# Patient Record
Sex: FEMALE | Race: WHITE | NOT HISPANIC OR LATINO | Employment: FULL TIME | URBAN - NONMETROPOLITAN AREA
[De-identification: names, ages, dates, MRNs, and addresses within clinical notes are randomized per-mention and may not be internally consistent; named-entity substitution may affect disease eponyms.]

---

## 2017-06-15 LAB — PAP RECOMMENDATION EXT: NORMAL

## 2018-08-01 ENCOUNTER — HISTORICAL (OUTPATIENT)
Dept: ADMINISTRATIVE | Facility: HOSPITAL | Age: 23
End: 2018-08-01

## 2019-06-10 ENCOUNTER — HISTORICAL (OUTPATIENT)
Dept: ADMINISTRATIVE | Facility: HOSPITAL | Age: 24
End: 2019-06-10

## 2020-08-19 ENCOUNTER — HISTORICAL (OUTPATIENT)
Dept: ADMINISTRATIVE | Facility: HOSPITAL | Age: 25
End: 2020-08-19

## 2021-09-15 LAB
ALBUMIN SERPL-MCNC: 3.9 G/DL (ref 3.4–5)
ALBUMIN/GLOB SERPL: 1.3 {RATIO}
ALP SERPL-CCNC: 88 U/L (ref 50–144)
ALT SERPL-CCNC: 63 U/L (ref 1–45)
ANION GAP SERPL CALC-SCNC: 7 MMOL/L (ref 7–16)
AST SERPL-CCNC: 24 U/L (ref 14–36)
BASOPHILS # BLD AUTO: 0.04 X10(3)/MCL (ref 0.01–0.08)
BASOPHILS NFR BLD AUTO: 0.5 % (ref 0.1–1.2)
BILIRUB SERPL-MCNC: 0.36 MG/DL (ref 0.1–1)
BUN SERPL-MCNC: 9 MG/DL (ref 7–20)
CALCIUM SERPL-MCNC: 8.9 MG/DL (ref 8.4–10.2)
CHLORIDE SERPL-SCNC: 106 MMOL/L (ref 94–110)
CO2 SERPL-SCNC: 27 MMOL/L (ref 21–32)
CREAT SERPL-MCNC: 0.5 MG/DL (ref 0.52–1.04)
CREAT/UREA NIT SERPL: 18 (ref 12–20)
EOSINOPHIL # BLD AUTO: 0.19 X10(3)/MCL (ref 0.04–0.36)
EOSINOPHIL NFR BLD AUTO: 2.5 % (ref 0.7–7)
ERYTHROCYTE [DISTWIDTH] IN BLOOD BY AUTOMATED COUNT: 13.6 % (ref 11–14.5)
EST. AVERAGE GLUCOSE BLD GHB EST-MCNC: 120 MG/DL (ref 70–115)
GLOBULIN SER-MCNC: 3 G/DL (ref 2–3.9)
GLUCOSE SERPL-MCNC: 129 MGM./DL (ref 70–115)
HBA1C MFR BLD: 6 % (ref 4–6)
HCT VFR BLD AUTO: 42.7 % (ref 36–48)
HGB BLD-MCNC: 13.8 G/DL (ref 11.8–16)
IMM GRANULOCYTES # BLD AUTO: 0.01 X10E3/UL (ref 0–0.03)
IMM GRANULOCYTES NFR BLD AUTO: 0.1 % (ref 0–0.5)
LYMPHOCYTES # BLD AUTO: 2.07 X10(3)/MCL (ref 1.16–3.74)
LYMPHOCYTES NFR BLD AUTO: 27.6 % (ref 20–55)
MCH RBC QN AUTO: 27.2 PG (ref 27–34)
MCHC RBC AUTO-ENTMCNC: 32.3 G/DL (ref 31–37)
MCV RBC AUTO: 84.2 FL (ref 79–99)
MONOCYTES # BLD AUTO: 0.54 X10(3)/MCL (ref 0.24–0.36)
MONOCYTES NFR BLD AUTO: 7.2 % (ref 4.7–12.5)
NEUTROPHILS # BLD AUTO: 4.65 X10(3)/MCL (ref 1.56–6.13)
NEUTROPHILS NFR BLD AUTO: 62.1 % (ref 37–73)
PLATELET # BLD AUTO: 189 X10(3)/MCL (ref 140–371)
PMV BLD AUTO: 11.9 FL (ref 9.4–12.4)
POTASSIUM SERPL-SCNC: 4.2 MMOL/L (ref 3.5–5.1)
PROT SERPL-MCNC: 6.9 G/DL (ref 6.3–8.2)
RBC # BLD AUTO: 5.07 X10(6)/MCL (ref 4–5.1)
SODIUM SERPL-SCNC: 140 MMOL/L (ref 135–145)
WBC # SPEC AUTO: 7.5 X10(3)/MCL (ref 4–11.5)

## 2022-03-15 ENCOUNTER — HISTORICAL (OUTPATIENT)
Dept: ADMINISTRATIVE | Facility: HOSPITAL | Age: 27
End: 2022-03-15

## 2022-04-10 ENCOUNTER — HISTORICAL (OUTPATIENT)
Dept: ADMINISTRATIVE | Facility: HOSPITAL | Age: 27
End: 2022-04-10

## 2022-04-27 VITALS
BODY MASS INDEX: 42.41 KG/M2 | WEIGHT: 248.44 LBS | DIASTOLIC BLOOD PRESSURE: 92 MMHG | SYSTOLIC BLOOD PRESSURE: 128 MMHG | HEIGHT: 64 IN | OXYGEN SATURATION: 99 %

## 2022-05-01 ENCOUNTER — HISTORICAL (OUTPATIENT)
Dept: ADMINISTRATIVE | Facility: HOSPITAL | Age: 27
End: 2022-05-01

## 2022-09-30 RX ORDER — CETIRIZINE HYDROCHLORIDE 10 MG/1
10 TABLET ORAL
COMMUNITY
Start: 2021-09-21 | End: 2022-10-03

## 2022-09-30 RX ORDER — BUTALBITAL, ACETAMINOPHEN AND CAFFEINE 300; 40; 50 MG/1; MG/1; MG/1
CAPSULE ORAL
COMMUNITY
Start: 2021-10-18 | End: 2022-10-03

## 2022-10-03 ENCOUNTER — OFFICE VISIT (OUTPATIENT)
Dept: INTERNAL MEDICINE | Facility: CLINIC | Age: 27
End: 2022-10-03
Payer: MEDICAID

## 2022-10-03 ENCOUNTER — LAB VISIT (OUTPATIENT)
Dept: LAB | Facility: HOSPITAL | Age: 27
End: 2022-10-03
Attending: NURSE PRACTITIONER
Payer: MEDICAID

## 2022-10-03 VITALS
HEIGHT: 64 IN | DIASTOLIC BLOOD PRESSURE: 84 MMHG | RESPIRATION RATE: 16 BRPM | BODY MASS INDEX: 43.36 KG/M2 | TEMPERATURE: 98 F | HEART RATE: 64 BPM | SYSTOLIC BLOOD PRESSURE: 137 MMHG | WEIGHT: 254 LBS

## 2022-10-03 DIAGNOSIS — R73.01 IFG (IMPAIRED FASTING GLUCOSE): ICD-10-CM

## 2022-10-03 DIAGNOSIS — Z11.59 NEED FOR HEPATITIS C SCREENING TEST: ICD-10-CM

## 2022-10-03 DIAGNOSIS — Z00.00 WELLNESS EXAMINATION: ICD-10-CM

## 2022-10-03 DIAGNOSIS — F41.8 DEPRESSION WITH ANXIETY: ICD-10-CM

## 2022-10-03 DIAGNOSIS — G43.709 CHRONIC MIGRAINE WITHOUT AURA WITHOUT STATUS MIGRAINOSUS, NOT INTRACTABLE: ICD-10-CM

## 2022-10-03 DIAGNOSIS — R51.9 WORSENING HEADACHES: ICD-10-CM

## 2022-10-03 DIAGNOSIS — Z00.00 WELLNESS EXAMINATION: Primary | ICD-10-CM

## 2022-10-03 LAB
ALBUMIN SERPL-MCNC: 3.8 GM/DL (ref 3.5–5)
ALBUMIN/GLOB SERPL: 1.1 RATIO (ref 1.1–2)
ALP SERPL-CCNC: 63 UNIT/L (ref 40–150)
ALT SERPL-CCNC: 73 UNIT/L (ref 0–55)
AST SERPL-CCNC: 26 UNIT/L (ref 5–34)
BASOPHILS # BLD AUTO: 0.03 X10(3)/MCL (ref 0–0.2)
BASOPHILS NFR BLD AUTO: 0.5 %
BILIRUBIN DIRECT+TOT PNL SERPL-MCNC: 0.5 MG/DL
BUN SERPL-MCNC: 10.8 MG/DL (ref 7–18.7)
CALCIUM SERPL-MCNC: 9.3 MG/DL (ref 8.4–10.2)
CHLORIDE SERPL-SCNC: 109 MMOL/L (ref 98–107)
CHOLEST SERPL-MCNC: 188 MG/DL
CHOLEST/HDLC SERPL: 4 {RATIO} (ref 0–5)
CO2 SERPL-SCNC: 26 MMOL/L (ref 22–29)
CREAT SERPL-MCNC: 0.6 MG/DL (ref 0.55–1.02)
DEPRECATED CALCIDIOL+CALCIFEROL SERPL-MC: 19.2 NG/ML (ref 30–80)
EOSINOPHIL # BLD AUTO: 0.12 X10(3)/MCL (ref 0–0.9)
EOSINOPHIL NFR BLD AUTO: 2 %
ERYTHROCYTE [DISTWIDTH] IN BLOOD BY AUTOMATED COUNT: 13.9 % (ref 11.5–17)
EST. AVERAGE GLUCOSE BLD GHB EST-MCNC: 137 MG/DL
GFR SERPLBLD CREATININE-BSD FMLA CKD-EPI: >60 MLS/MIN/1.73/M2
GLOBULIN SER-MCNC: 3.5 GM/DL (ref 2.4–3.5)
GLUCOSE SERPL-MCNC: 114 MG/DL (ref 74–100)
HBA1C MFR BLD: 6.4 %
HCT VFR BLD AUTO: 44.4 % (ref 37–47)
HCV AB SERPL QL IA: NONREACTIVE
HDLC SERPL-MCNC: 42 MG/DL (ref 35–60)
HGB BLD-MCNC: 13.7 GM/DL (ref 12–16)
IMM GRANULOCYTES # BLD AUTO: 0.01 X10(3)/MCL (ref 0–0.04)
IMM GRANULOCYTES NFR BLD AUTO: 0.2 %
LDLC SERPL CALC-MCNC: 121 MG/DL (ref 50–140)
LYMPHOCYTES # BLD AUTO: 2.57 X10(3)/MCL (ref 0.6–4.6)
LYMPHOCYTES NFR BLD AUTO: 43.9 %
MCH RBC QN AUTO: 26.4 PG (ref 27–31)
MCHC RBC AUTO-ENTMCNC: 30.9 MG/DL (ref 33–36)
MCV RBC AUTO: 85.7 FL (ref 80–94)
MONOCYTES # BLD AUTO: 0.37 X10(3)/MCL (ref 0.1–1.3)
MONOCYTES NFR BLD AUTO: 6.3 %
NEUTROPHILS # BLD AUTO: 2.8 X10(3)/MCL (ref 2.1–9.2)
NEUTROPHILS NFR BLD AUTO: 47.1 %
NRBC BLD AUTO-RTO: 0 %
PLATELET # BLD AUTO: 215 X10(3)/MCL (ref 130–400)
PMV BLD AUTO: 11.4 FL (ref 7.4–10.4)
POTASSIUM SERPL-SCNC: 4.1 MMOL/L (ref 3.5–5.1)
PROT SERPL-MCNC: 7.3 GM/DL (ref 6.4–8.3)
RBC # BLD AUTO: 5.18 X10(6)/MCL (ref 4.2–5.4)
SODIUM SERPL-SCNC: 142 MMOL/L (ref 136–145)
TRIGL SERPL-MCNC: 126 MG/DL (ref 37–140)
VLDLC SERPL CALC-MCNC: 25 MG/DL
WBC # SPEC AUTO: 5.9 X10(3)/MCL (ref 4.5–11.5)

## 2022-10-03 PROCEDURE — 1160F RVW MEDS BY RX/DR IN RCRD: CPT | Mod: CPTII,,, | Performed by: NURSE PRACTITIONER

## 2022-10-03 PROCEDURE — 3075F SYST BP GE 130 - 139MM HG: CPT | Mod: CPTII,,, | Performed by: NURSE PRACTITIONER

## 2022-10-03 PROCEDURE — 3079F DIAST BP 80-89 MM HG: CPT | Mod: CPTII,,, | Performed by: NURSE PRACTITIONER

## 2022-10-03 PROCEDURE — 99203 OFFICE O/P NEW LOW 30 MIN: CPT | Mod: S$PBB,,, | Performed by: NURSE PRACTITIONER

## 2022-10-03 PROCEDURE — 86803 HEPATITIS C AB TEST: CPT

## 2022-10-03 PROCEDURE — 3008F PR BODY MASS INDEX (BMI) DOCUMENTED: ICD-10-PCS | Mod: CPTII,,, | Performed by: NURSE PRACTITIONER

## 2022-10-03 PROCEDURE — 80053 COMPREHEN METABOLIC PANEL: CPT

## 2022-10-03 PROCEDURE — 3079F PR MOST RECENT DIASTOLIC BLOOD PRESSURE 80-89 MM HG: ICD-10-PCS | Mod: CPTII,,, | Performed by: NURSE PRACTITIONER

## 2022-10-03 PROCEDURE — 83036 HEMOGLOBIN GLYCOSYLATED A1C: CPT

## 2022-10-03 PROCEDURE — 1160F PR REVIEW ALL MEDS BY PRESCRIBER/CLIN PHARMACIST DOCUMENTED: ICD-10-PCS | Mod: CPTII,,, | Performed by: NURSE PRACTITIONER

## 2022-10-03 PROCEDURE — 99203 PR OFFICE/OUTPT VISIT, NEW, LEVL III, 30-44 MIN: ICD-10-PCS | Mod: S$PBB,,, | Performed by: NURSE PRACTITIONER

## 2022-10-03 PROCEDURE — 3008F BODY MASS INDEX DOCD: CPT | Mod: CPTII,,, | Performed by: NURSE PRACTITIONER

## 2022-10-03 PROCEDURE — 36415 COLL VENOUS BLD VENIPUNCTURE: CPT

## 2022-10-03 PROCEDURE — 3075F PR MOST RECENT SYSTOLIC BLOOD PRESS GE 130-139MM HG: ICD-10-PCS | Mod: CPTII,,, | Performed by: NURSE PRACTITIONER

## 2022-10-03 PROCEDURE — 85025 COMPLETE CBC W/AUTO DIFF WBC: CPT

## 2022-10-03 PROCEDURE — 1159F PR MEDICATION LIST DOCUMENTED IN MEDICAL RECORD: ICD-10-PCS | Mod: CPTII,,, | Performed by: NURSE PRACTITIONER

## 2022-10-03 PROCEDURE — 1159F MED LIST DOCD IN RCRD: CPT | Mod: CPTII,,, | Performed by: NURSE PRACTITIONER

## 2022-10-03 PROCEDURE — 82306 VITAMIN D 25 HYDROXY: CPT

## 2022-10-03 PROCEDURE — 99214 OFFICE O/P EST MOD 30 MIN: CPT | Mod: PBBFAC | Performed by: NURSE PRACTITIONER

## 2022-10-03 PROCEDURE — 80061 LIPID PANEL: CPT

## 2022-10-03 RX ORDER — DULOXETIN HYDROCHLORIDE 60 MG/1
60 CAPSULE, DELAYED RELEASE ORAL DAILY
COMMUNITY
Start: 2022-09-21 | End: 2022-11-30 | Stop reason: SDUPTHER

## 2022-10-03 RX ORDER — TOPIRAMATE 25 MG/1
TABLET ORAL
COMMUNITY
Start: 2022-09-23 | End: 2022-10-26

## 2022-10-03 RX ORDER — PROPRANOLOL HYDROCHLORIDE 80 MG/1
80 TABLET ORAL 2 TIMES DAILY
COMMUNITY
Start: 2022-09-21 | End: 2023-08-18 | Stop reason: SDUPTHER

## 2022-10-03 NOTE — ASSESSMENT & PLAN NOTE
A1C Today   Follow ADA diet.  Avoid soda, simple sweets, and limit rice/pasta/bread/starches and consume brown options when possible.   Maintain healthy weight with BMI goal <30.   Perform aerobic exercise for 150 minutes per week (or 5 days a week for 30 minutes each day).   Examine feet daily.

## 2022-10-03 NOTE — ASSESSMENT & PLAN NOTE
RX Cymbalta 60 mg daily  Referral to Dr. Chaney  Read positive daily meditations, avoid negative media, set healthy boundaries.  Exercise daily, keep consistent sleep pattern, eat a healthy diet.  Establish good social support, make changes to reduce stress.  Do not drink alcohol or use illicit drugs.  Reports any symptoms of suicidal/homicidal ideations or self harm immediately, go to nearest emergency room.

## 2022-10-03 NOTE — PROGRESS NOTES
"  GRACY Ambrosio   [facility]  [address] [address]      PATIENT NAME: Cassandra Escobedo  : 1995  DATE: 10/3/22  MRN: [id number]      Billing Provider: GRACY Ambrosio  Level of Service: AR OFFICE/OUTPT VISIT, NEW, LEVL III, 30-44 MIN  Patient PCP Information       Provider PCP Type    GRACY Ambrosio General            Reason for Visit / Chief Complaint: No chief complaint on file.       History of Present Illness / Problem Focused Workflow     Cassandra Escobedo presents to the clinic with No chief complaint on file.     26 yo WF here today to establish care, previous PCP Norma Mcfarlane.    Cervical Cancer Screening:  Osteoporosis Screening: 10/03/22  HCV Screening: 10/03/22    10/03/22  Pt here today w/ complaints of migraines and "sharp pains in her head", was prescribed Topamax by her previous provider but has not started it yet and was to f/u in 2 months.   Reports started with migraines about 2 years ago, they have started worsening over the last 2 months they have increased in frequency and in intensity, reports it starts at sharp pains starting from the base of her head and then radiate to the front of her head that causes dizziness when she stands, denies any pattern; aggravating factors include alcohol & stress, reports it is mostly the right side of her head and then slowly works it way to the left side, reports n/v when it occurs, denies photosensitivity; occurs multiple times per day - she reports that she has sharp pains occur multiple times per day, every day and last up to 10-15 minutes at at time and then the dull throbbing continues through out the day.  Reports migraines occur 1 - 2 times per week; wears glasses, yearly vision check up is this month. Pt is currently on Cymbalta for anxiety / depression and the propanolol is for the migraines, both scheduled medications. Takes ibuprofen 800 mg as needed. " Denies any correlation with these symptoms to her cycle. The pt request further testing as medication conservative treatment therapies have not worked well. MRI brain without contrast ordered today, will f/u with pt juanito  few weeks to discuss labs via audio.   Denies chest pain, shortness of breath, cough, fever, headache, dizziness, weakness, abdominal pain, nausea, vomiting, diarrhea, constipation, black/bloody stools, unplanned weight loss, night sweats, changes in urinary patterns, burning/odor with urination, depression, anxiety, and SI/HI.         Review of Systems     Review of Systems   Constitutional: Negative.    HENT: Negative.     Eyes: Negative.    Respiratory: Negative.     Cardiovascular: Negative.    Gastrointestinal: Negative.    Endocrine: Negative.    Genitourinary: Negative.    Neurological:  Positive for headaches.   Psychiatric/Behavioral: Negative.       Medical / Social / Family History     Past Medical History:   Diagnosis Date    Anxiety        Past Surgical History:   Procedure Laterality Date     SECTION  2018    ELBOW SURGERY Right 2000       Social History  Ms.  reports that she has been smoking cigarettes. She has never used smokeless tobacco. She reports current alcohol use. She reports current drug use. Drug: Marijuana.    Family History  Ms.'s family history includes Diabetes in her mother; Hyperlipidemia in her father and mother; Hypertension in her mother.    Medications and Allergies     Medications  Medication List with Changes/Refills   Current Medications    DULOXETINE (CYMBALTA) 60 MG CAPSULE    Take 60 mg by mouth once daily.    PROPRANOLOL (INDERAL) 80 MG TABLET    Take 80 mg by mouth 2 (two) times daily.    TOPIRAMATE (TOPAMAX) 25 MG TABLET    Take by mouth. Will start at later date   Discontinued Medications    BUTALBITAL-ACETAMINOPHEN-CAFF -40 MG CAP      See Instructions, TAKE ONE CAPSULE BY MOUTH EVERY 4 HOURS AS NEEDED, # 30 cap(s), 2 Refill(s),  Pharm[facility]: [facility] 163, cm, Height/Length Dosing, 09/21/21 8:52:00 CDT, 114.2, kg, Weight Dosing, 09/21/21 8:52:00 CDT    CETIRIZINE (ZYRTEC) 10 MG TABLET    Take 10 mg by mouth.        Allergies  Review of patient's allergies indicates:  No Known Allergies    Physical Examination     Vitals:    10/03/22 1007   BP: 137/84   Pulse: 64   Resp: 16   Temp: 98 °F (36.7 °C)     Physical Exam  Vitals reviewed.   Constitutional:       Appearance: Normal appearance. She is normal weight.   HENT:      Head: Normocephalic.   Cardiovascular:      Rate and Rhythm: Normal rate and regular rhythm.      Pulses: Normal pulses.      Heart sounds: Normal heart sounds.   Pulmonary:      Effort: Pulmonary effort is normal.      Breath sounds: Normal breath sounds.   Abdominal:      General: Abdomen is flat.      Palpations: Abdomen is soft.   Musculoskeletal:         General: Normal range of motion.      Cervical back: Normal range of motion.   Skin:     General: Skin is warm and dry.   Neurological:      Mental Status: She is alert.   Psychiatric:         Mood and Affect: Mood normal.         Results     Lab Results   Component Value Date    WBC 7.5 09/15/2021    RBC 5.07 09/15/2021    HGB 13.8 09/15/2021    HCT 42.7 09/15/2021    MCV 84.2 09/15/2021    MCH 27.2 09/15/2021    MCHC 32.3 09/15/2021    RDW 13.6 09/15/2021     09/15/2021    MPV 11.9 09/15/2021     CMP  Sodium Level   Date Value Ref Range Status   09/15/2021 140 135 - 145 mmol/L Final     Potassium Level   Date Value Ref Range Status   09/15/2021 4.2 3.5 - 5.1 mmol/L Final     Carbon Dioxide   Date Value Ref Range Status   09/15/2021 27 21 - 32 mmol/L Final     Blood Urea Nitrogen   Date Value Ref Range Status   09/15/2021 9 7 - 20 mg/dL Final     Creatinine   Date Value Ref Range Status   09/15/2021 0.50 (L) 0.52 - 1.04 mg/dL Final     Calcium Level Total   Date Value Ref Range Status   09/15/2021 8.9 8.4 - 10.2 mg/dL Final     Albumin Level   Date Value Ref  Range Status   09/15/2021 3.9 3.4 - 5.0 g/dL Final     Bilirubin Total   Date Value Ref Range Status   09/15/2021 0.36 0.10 - 1.00 mg/dL Final     Alkaline Phosphatase   Date Value Ref Range Status   09/15/2021 88 50 - 144 U/L Final     Aspartate Aminotransferase   Date Value Ref Range Status   09/15/2021 24 14 - 36 U/L Final     Alanine Aminotransferase   Date Value Ref Range Status   09/15/2021 63 (H) 1 - 45 U/L Final     eGFR   Date Value Ref Range Status   09/15/2021 159 mL/min/1.73 m2 Final     No results found for: CHOL  No results found for: HDL  No results found for: LDLCALC  No results found for: TRIG  No results found for: CHOLHDL  No results found for: TSH  No results found for: PHUR, SPECGRAV, PROTEINUA, GLUCUA, KETONESU, OCCULTUA, NITRITE, LEUKOCYTESUR        Assessment and Plan (including Health Maintenance)     Plan:         Health Maintenance Due   Topic Date Due    Hepatitis C Screening  Never done    Lipid Panel  Never done    COVID-19 Vaccine (1) Never done    Pneumococcal Vaccines (Age 0-64) (1 - PCV) 02/10/2001    HIV Screening  Never done    Pap Smear  Never done    Influenza Vaccine (1) 09/01/2022       Problem List Items Addressed This Visit          Neuro    Worsening headaches    Current Assessment & Plan     MRI Brain         Relevant Orders    MRI Brain Without Contrast    Chronic migraine without aura without status migrainosus, not intractable    Current Assessment & Plan     MRI Brain         Relevant Orders    MRI Brain Without Contrast       Psychiatric    Depression with anxiety    Current Assessment & Plan     RX Cymbalta 60 mg daily  Referral to Dr. Chaney  Read positive daily meditations, avoid negative media, set healthy boundaries.  Exercise daily, keep consistent sleep pattern, eat a healthy diet.  Establish good social support, make changes to reduce stress.  Do not drink alcohol or use illicit drugs.  Reports any symptoms of suicidal/homicidal ideations or self harm  immediately, go to nearest emergency room.           Relevant Orders    Ambulatory referral/consult to Psychiatry       Endocrine    IFG (impaired fasting glucose)    Current Assessment & Plan     A1C Today   Follow ADA diet.  Avoid soda, simple sweets, and limit rice/pasta/bread/starches and consume brown options when possible.   Maintain healthy weight with BMI goal <30.   Perform aerobic exercise for 150 minutes per week (or 5 days a week for 30 minutes each day).   Examine feet daily.            Relevant Orders    Hemoglobin A1C     Other Visit Diagnoses       Wellness examination    -  Primary    Relevant Orders    CBC Auto Differential    Comprehensive Metabolic Panel    Lipid Panel    Urinalysis, Reflex to Urine Culture    Vitamin D    Need for hepatitis C screening test        Relevant Orders    Hepatitis C Antibody            Health Maintenance Topics with due status: Not Due       Topic Last Completion Date    TETANUS VACCINE 11/22/2017       No future appointments.         Signature:     [facility]  [address] [address]    Date of encounter: 10/3/22

## 2022-10-17 ENCOUNTER — APPOINTMENT (OUTPATIENT)
Dept: RADIOLOGY | Facility: HOSPITAL | Age: 27
End: 2022-10-17
Attending: NURSE PRACTITIONER
Payer: MEDICAID

## 2022-10-17 DIAGNOSIS — G43.709 CHRONIC MIGRAINE WITHOUT AURA WITHOUT STATUS MIGRAINOSUS, NOT INTRACTABLE: ICD-10-CM

## 2022-10-17 DIAGNOSIS — R51.9 WORSENING HEADACHES: ICD-10-CM

## 2022-10-17 PROCEDURE — 70551 MRI BRAIN STEM W/O DYE: CPT | Mod: TC

## 2022-10-26 ENCOUNTER — OFFICE VISIT (OUTPATIENT)
Dept: INTERNAL MEDICINE | Facility: CLINIC | Age: 27
End: 2022-10-26
Payer: MEDICAID

## 2022-10-26 DIAGNOSIS — R51.9 WORSENING HEADACHES: Primary | ICD-10-CM

## 2022-10-26 DIAGNOSIS — J34.1 MAXILLARY SINUS CYST: ICD-10-CM

## 2022-10-26 DIAGNOSIS — R73.03 PREDIABETES: Chronic | ICD-10-CM

## 2022-10-26 DIAGNOSIS — E55.9 VITAMIN D DEFICIENCY: ICD-10-CM

## 2022-10-26 PROCEDURE — 1160F RVW MEDS BY RX/DR IN RCRD: CPT | Mod: CPTII,95,, | Performed by: NURSE PRACTITIONER

## 2022-10-26 PROCEDURE — 1160F PR REVIEW ALL MEDS BY PRESCRIBER/CLIN PHARMACIST DOCUMENTED: ICD-10-PCS | Mod: CPTII,95,, | Performed by: NURSE PRACTITIONER

## 2022-10-26 PROCEDURE — 1159F PR MEDICATION LIST DOCUMENTED IN MEDICAL RECORD: ICD-10-PCS | Mod: CPTII,95,, | Performed by: NURSE PRACTITIONER

## 2022-10-26 PROCEDURE — 99214 PR OFFICE/OUTPT VISIT, EST, LEVL IV, 30-39 MIN: ICD-10-PCS | Mod: 95,,, | Performed by: NURSE PRACTITIONER

## 2022-10-26 PROCEDURE — 99214 OFFICE O/P EST MOD 30 MIN: CPT | Mod: 95,,, | Performed by: NURSE PRACTITIONER

## 2022-10-26 PROCEDURE — 1159F MED LIST DOCD IN RCRD: CPT | Mod: CPTII,95,, | Performed by: NURSE PRACTITIONER

## 2022-10-26 RX ORDER — CETIRIZINE HYDROCHLORIDE 10 MG/1
TABLET ORAL
COMMUNITY
Start: 2022-07-26

## 2022-10-26 RX ORDER — IBUPROFEN 800 MG/1
800 TABLET ORAL
COMMUNITY
Start: 2022-03-09 | End: 2023-08-18

## 2022-10-26 NOTE — ASSESSMENT & PLAN NOTE
Educated on increasing foods high in Vitamin D such as fish oil, cod liver oil, salmon, milk fortified with vitamin D.  g Vitamin D 2000 I.U. tablets daily (purchase over the counter).  Repeat Vitamin D level as ordered.

## 2022-10-26 NOTE — PROGRESS NOTES
"  GRACY Ambrosio   [facility]  [address] [address]      PATIENT NAME: Cassandra Escobedo  : 1995  DATE: 10/26/22  MRN: [id number]      Billing Provider: GRACY Ambrosio  Level of Service: PA OFFICE/OUTPT VISIT, EST, LEVL IV, 30-39 MIN  Patient PCP Information       Provider PCP Type    GRACY Ambrosio General            Reason for Visit / Chief Complaint: Follow-up (Lab review )       History of Present Illness / Problem Focused Workflow     Cassandra Escobedo presents to the clinic with Follow-up (Lab review )     26 yo WF here today for f/u. PMH Migraines & anxiety.     Cervical Cancer Screening:  Osteoporosis Screening: 10/03/22  HCV Screening: 10/03/22    10/03/22  Pt here today w/ complaints of migraines and "sharp pains in her head", was prescribed Topamax by her previous provider but has not started it yet and was to f/u in 2 months.   Reports started with migraines about 2 years ago, they have started worsening over the last 2 months they have increased in frequency and in intensity, reports it starts at sharp pains starting from the base of her head and then radiate to the front of her head that causes dizziness when she stands, denies any pattern; aggravating factors include alcohol & stress, reports it is mostly the right side of her head and then slowly works it way to the left side, reports n/v when it occurs, denies photosensitivity; occurs multiple times per day - she reports that she has sharp pains occur multiple times per day, every day and last up to 10-15 minutes at at time and then the dull throbbing continues through out the day.  Reports migraines occur 1 - 2 times per week; wears glasses, yearly vision check up is this month. Pt is currently on Cymbalta for anxiety / depression and the propanolol is for the migraines, both scheduled medications. Takes ibuprofen 800 mg as needed. Denies any correlation " with these symptoms to her cycle. The pt request further testing as medication conservative treatment therapies have not worked well. MRI brain without contrast ordered today, will f/u with pt in a  few weeks to discuss labs via audio.     10/26/22  Pt here today for lab review via audio and MRI results. Labs discussed with patient with no questions or concerns at this time. Discussed low Vitamin D level, pt will start OTC Vitamin D. Discussed prediabetes status and increase in A1C from last year to 0.1 point away from T2DM, pt is aware, we will eval again in 6 months. MRI results discussed that was ordered for her headaches, MRI was negative, will refer to [facility] clinic today for further evaluation. MRI did note a cyst in the maxillary sinus cavity, pt denies any allergy / sinus type issues just a pressure with her headaches, will refer to ENT today as well for further eval. Agreeable to 6 month f/u via audio virtual with fasting labs to eval prediabetes status. Denies any issues today.   Denies chest pain, shortness of breath, cough, fever, headache, dizziness, weakness, abdominal pain, nausea, vomiting, diarrhea, constipation, black/bloody stools, unplanned weight loss, night sweats, changes in urinary patterns, burning/odor with urination, depression, anxiety, and SI/HI.       Follow-up  Associated symptoms include headaches.     Review of Systems     Review of Systems   Constitutional: Negative.    HENT: Negative.     Eyes: Negative.    Respiratory: Negative.     Cardiovascular: Negative.    Gastrointestinal: Negative.    Endocrine: Negative.    Genitourinary: Negative.    Neurological:  Positive for headaches.   Psychiatric/Behavioral: Negative.       Medical / Social / Family History     Past Medical History:   Diagnosis Date    Anxiety        Past Surgical History:   Procedure Laterality Date     SECTION  2018    ELBOW SURGERY Right        Social History  Ms.  reports that she has  been smoking cigarettes. She has never used smokeless tobacco. She reports current alcohol use. She reports current drug use. Drug: Marijuana.    Family History  Ms.'s family history includes Diabetes in her mother; Hyperlipidemia in her father and mother; Hypertension in her mother.    Medications and Allergies     Medications  Medication List with Changes/Refills   Current Medications    CETIRIZINE (ZYRTEC) 10 MG TABLET      See Instructions, TAKE ONE TABLET BY MOUTH EVERY DAY, # 30 tab(s), 1 Refill(s), Pharmacy: [facility], 163, cm, Height/Length Dosing, 07/05/22 11:37:00 CDT, 113.1, kg, Weight Dosing, 07/05/22 11:37:00 CDT    DULOXETINE (CYMBALTA) 60 MG CAPSULE    Take 60 mg by mouth once daily.    IBUPROFEN (ADVIL,MOTRIN) 800 MG TABLET    Take 800 mg by mouth.    PROPRANOLOL (INDERAL) 80 MG TABLET    Take 80 mg by mouth 2 (two) times daily.   Discontinued Medications    TOPIRAMATE (TOPAMAX) 25 MG TABLET    Take by mouth. Will start at later date         Allergies  Review of patient's allergies indicates:  No Known Allergies    Physical Examination   There were no vitals filed for this visit.  Physical Exam  HENT:      Right Ear: Hearing normal.      Left Ear: Hearing normal.   Neurological:      Mental Status: She is alert and oriented to person, place, and time.   Psychiatric:         Mood and Affect: Mood normal.         Results     Lab Results   Component Value Date    WBC 5.9 10/03/2022    RBC 5.18 10/03/2022    HGB 13.7 10/03/2022    HCT 44.4 10/03/2022    MCV 85.7 10/03/2022    MCH 26.4 (L) 10/03/2022    MCHC 30.9 (L) 10/03/2022    RDW 13.9 10/03/2022     10/03/2022    MPV 11.4 (H) 10/03/2022     Sodium Level   Date Value Ref Range Status   10/03/2022 142 136 - 145 mmol/L Final     Potassium Level   Date Value Ref Range Status   10/03/2022 4.1 3.5 - 5.1 mmol/L Final     Carbon Dioxide   Date Value Ref Range Status   10/03/2022 26 22 - 29 mmol/L Final     Blood Urea Nitrogen   Date Value Ref Range  Status   10/03/2022 10.8 7.0 - 18.7 mg/dL Final     Creatinine   Date Value Ref Range Status   10/03/2022 0.60 0.55 - 1.02 mg/dL Final     Calcium Level Total   Date Value Ref Range Status   10/03/2022 9.3 8.4 - 10.2 mg/dL Final     Albumin Level   Date Value Ref Range Status   10/03/2022 3.8 3.5 - 5.0 gm/dL Final     Bilirubin Total   Date Value Ref Range Status   10/03/2022 0.5 <=1.5 mg/dL Final     Alkaline Phosphatase   Date Value Ref Range Status   10/03/2022 63 40 - 150 unit/L Final     Aspartate Aminotransferase   Date Value Ref Range Status   10/03/2022 26 5 - 34 unit/L Final     Alanine Aminotransferase   Date Value Ref Range Status   10/03/2022 73 (H) 0 - 55 unit/L Final     eGFR   Date Value Ref Range Status   09/15/2021 159 mL/min/1.73 m2 Final     Lab Results   Component Value Date    CHOL 188 10/03/2022     Lab Results   Component Value Date    HDL 42 10/03/2022     No results found for: LDLCALC  Lab Results   Component Value Date    TRIG 126 10/03/2022     No results found for: CHOLHDL  No results found for: TSH  Lab Results   Component Value Date    PROTEINUA Negative 10/03/2022    LEUKOCYTESUR 75 10/03/2022     Lab Results   Component Value Date    HGBA1C 6.4 10/03/2022    HGBA1C 6.0 09/15/2021     No results found for: MICROALBUR, [id number]   No results found for this or any previous visit.         Assessment and Plan (including Health Maintenance)     Plan:         Health Maintenance Due   Topic Date Due    COVID-19 Vaccine (1) Never done    Pneumococcal Vaccines (Age 0-64) (1 - PCV) 02/10/2001    HIV Screening  Never done    Pap Smear  Never done    Influenza Vaccine (1) 09/01/2022       Problem List Items Addressed This Visit          Neuro    Worsening headaches - Primary    Current Assessment & Plan     Referral to [facility]          Relevant Orders    Ambulatory referral/consult to ENT       ENT    Maxillary sinus cyst    Current Assessment & Plan     Referral to ENT          Relevant  Orders    Ambulatory referral/consult to Neurology       Endocrine    Prediabetes (Chronic)    Current Assessment & Plan     Follow ADA diet.  Avoid soda, simple sweets, and limit rice/pasta/bread/starches and consume brown options when possible.   Maintain healthy weight with BMI goal <30.   Perform aerobic exercise for 150 minutes per week (or 5 days a week for 30 minutes each day).              Relevant Orders    Urinalysis, Reflex to Urine Culture Urine, Clean Catch    Microalbumin/Creatinine Ratio, Urine    Basic Metabolic Panel    Hemoglobin A1C    Vitamin D deficiency    Current Assessment & Plan     Educated on increasing foods high in Vitamin D such as fish oil, cod liver oil, salmon, milk fortified with vitamin D.  g Vitamin D 2000 I.U. tablets daily (purchase over the counter).  Repeat Vitamin D level as ordered.              Health Maintenance Topics with due status: Not Due       Topic Last Completion Date    TETANUS VACCINE 11/22/2017       Future Appointments   Date Time Provider Department Center   11/30/2022 10:00 AM Leo Chaney MD [facility]            Signature:     [facility]  [address] [facility] [address]    Date of encounter: 10/26/22    Established Patient - Audio Only Telehealth Visit     The patient location is: home  The chief complaint leading to consultation is: lab review  Visit type: Virtual visit with audio only (telephone)  Total time spent with patient: 11 mins       The reason for the audio only service rather than synchronous audio and video virtual visit was related to technical difficulties or patient preference/necessity.     Each patient to whom I provide medical services by telemedicine is:  (1) informed of the relationship between the physician and patient and the respective role of any other health care provider with respect to management of the patient; and (2) notified that they may decline  to receive medical services by telemedicine and may withdraw from such care at any time. Patient verbally consented to receive this service via voice-only telephone call.       This service was not originating from a related E/M service provided within the previous 7 days nor will  to an E/M service or procedure within the next 24 hours or my soonest available appointment.  Prevailing standard of care was able to be met in this audio-only visit.

## 2022-11-09 ENCOUNTER — OFFICE VISIT (OUTPATIENT)
Dept: OTOLARYNGOLOGY | Facility: CLINIC | Age: 27
End: 2022-11-09
Payer: MEDICAID

## 2022-11-09 VITALS
WEIGHT: 254.19 LBS | DIASTOLIC BLOOD PRESSURE: 80 MMHG | SYSTOLIC BLOOD PRESSURE: 115 MMHG | HEART RATE: 67 BPM | BODY MASS INDEX: 43.63 KG/M2 | TEMPERATURE: 98 F

## 2022-11-09 DIAGNOSIS — R09.81 NASAL CONGESTION: ICD-10-CM

## 2022-11-09 DIAGNOSIS — J31.0 RHINITIS, UNSPECIFIED TYPE: ICD-10-CM

## 2022-11-09 DIAGNOSIS — R51.9 WORSENING HEADACHES: ICD-10-CM

## 2022-11-09 PROCEDURE — 99213 OFFICE O/P EST LOW 20 MIN: CPT | Mod: PBBFAC | Performed by: STUDENT IN AN ORGANIZED HEALTH CARE EDUCATION/TRAINING PROGRAM

## 2022-11-09 PROCEDURE — 31231 NASAL ENDOSCOPY DX: CPT | Mod: PBBFAC | Performed by: STUDENT IN AN ORGANIZED HEALTH CARE EDUCATION/TRAINING PROGRAM

## 2022-11-09 NOTE — PROGRESS NOTES
The scope used for the exam was:  Flexible scope ENF-P4  Serial Number:  1)    [id number]    []   2)    [id number]    []   3)    [id number]    []   4)    [id number]    []   5)    [id number]    [x]   6)    [id number]    []       The scope used for the exam was:  Rigid scope   Serial Number:  1)   [id number]    []   2)   [id number]    []   3)   7330    []   4)    3384   []   5)    0824   []   6)    5554   []     7)   7425    []   8)   2240    []   9)   1109    []

## 2022-11-09 NOTE — PROGRESS NOTES
[facility]  Otolaryngology Clinic Note    Cassandra Escobedo  Encounter Date: 2022  YOB: 1995  Physician:     Chief Complaint: Headaches    HPI: Cassandra Escobedo is a 27 y.o. female with pmh of anxiety and depression who presents to clinic for evaluation of headaches and sinusitis. She states that the headaches began 2 years ago and have increased in intensity over the last year.  Pain begins on the rt side of the face and moves to the lt. The headaches are associated with rhinorrhea, tinnitus, N/V, watery eyes and dizziness. She has also been experiencing new onset epistaxis (about 5 in the last several weeks). She has been given propanolol in the past for the headaches but states that it does not provide relief. The headaches are triggered by caffeine, alcohol, chocolate and stress. MRI performed on 10/17/22 showed a left maxillary sinus mucosal retention cyst w/ no intracranial abnormalities. She has used Flonase and cetirizine in the past with minimal relief. Pertinent family history includes migraines (maternal aunt) and allergic rhinitis (mother). Referral to neurology pending.     ROS:   General: Negative except per HPI  Skin: Denies rash, ulcer, or lesion.  Eyes: Denies vision changes or diplopia.  Ears: Negative except per HPI  Nose: Negative except per HPI  Throat/mouth: Negative except per HPI  Cardiovascular: Negative except per HPI  Respiratory: Negative except per HPI  Neck: Negative except per HPI  Endocrine: Negative except per HPI  Neurologic: Negative except per HPI    Other 10-point review of systems negative except per HPI      Review of patient's allergies indicates:   Allergen Reactions    Phenergan plain     Sulfa (sulfonamide antibiotics)        Past Medical History:   Diagnosis Date    Anxiety     Hypertension        Past Surgical History:   Procedure Laterality Date     SECTION  2018    ELBOW SURGERY Right        Social History      Socioeconomic History    Marital status: Single   Tobacco Use    Smoking status: Some Days     Types: Cigarettes    Smokeless tobacco: Never   Substance and Sexual Activity    Alcohol use: Yes     Comment: occassionally    Drug use: Yes     Types: Marijuana    Sexual activity: Not Currently       Family History   Problem Relation Age of Onset    Hyperlipidemia Mother     Hypertension Mother     Diabetes Mother     Hyperlipidemia Father        Outpatient Encounter Medications as of 11/9/2022   Medication Sig Dispense Refill    DULoxetine (CYMBALTA) 60 MG capsule Take 60 mg by mouth once daily.      propranoloL (INDERAL) 80 MG tablet Take 80 mg by mouth 2 (two) times daily.      cetirizine (ZYRTEC) 10 MG tablet   See Instructions, TAKE ONE TABLET BY MOUTH EVERY DAY, # 30 tab(s), 1 Refill(s), [facility], 163, cm, Height/Length Dosing, 07/05/22 11:37:00 CDT, 113.1, kg, Weight Dosing, 07/05/22 11:37:00 CDT      ibuprofen (ADVIL,MOTRIN) 800 MG tablet Take 800 mg by mouth.       No facility-administered encounter medications on file as of 11/9/2022.       Physical Exam:  Vitals:    11/09/22 0915   BP: 115/80   Pulse: 67   Temp: 97.6 °F (36.4 °C)   Weight: 115.3 kg (254 lb 3.2 oz)       Constitutional  General Appearance: well nourished, well-developed, AAO x3, NAD  HEENT  Eyes: EOMI, normal conjunctivae  Ears: Hearing well at conversation level   AD: auricle normal, EAC normal, TM intact, no EUGENE   AS: auricle normal, EAC normal with some dried dark cerumen, TM intact, no EUGENE   Vestibular: ambulates without gait disturbance  Nose: septum midline with swell body noted , mild inferior turbinate hypertrophy, no polyps, moist mucosa without erythema or blue hue  OC/OP: dentition moderate, no oral lesions, tongue/FOM/BOT- soft, no leukoplakia/ulcerations/ tenderness, tonsils +  Nasopharynx, Hypopharynx, and Larynx:    Indirect: attempted, limited view due to patient intolerance  Neck: soft, non-tender, no palpable  lymph nodes   Thyroid region- no nodules or goiter  Neuro: CN II - XII intact bilaterally  Respiratory: non-labored respirations  Psychiatric: oriented to time, place and person, no depression, anxiety or agitation    Procedures:Flexible Fiberoptic Nasopharyngoscopy via bilateral nare    Procedure in Detail: Informed consent was obtained from the patient after explanation of procedure, indications, risks and benefits. Flexible endoscopy was performed through the nasal passages. The nasal cavity, nasopharynx.    Anesthesia: Topical Neosynephrine / Tetracaine  Adverse Events: None  Resident Surgeon: OLGA Goldstein MD   Blood loss: none  Condition: good    Findings:  NP/OP: no masses/lesions of NC, eustachian tube, fossa of Rosenmuller, no adenoid hypertrophy  Swell body noted at anterior septum with some contact with bilateral middle turbinate  Middle meatus open bilaterally  No purulence noted  No masses or lesions noted  Mild inferior turbinate hypertrophy bilaterally without rhinorrhea.  Nasopharynx clear without any mass or lesion.      Pertinent Data:  ? LABS:  ? AUDIO:  ? CT Scan:    Imaging:   I personally reviewed the following images:    Summary of Outside Records:      Assessment/Plan:  Cassandra Escobedo is a 27 y.o. female with pmh of anxiety and depression who presents to clinic for evaluation of headaches and rhinitis, congestion. Patient symptoms of headache associated with eye watering and rhinorrhea likely a migraine.  She is being treated medically for migraines.  She does have a neurology appointment in April 2023.  She does have some nasal congestion and rhinitis today.  She is tried Zyrtec in the past and intermittent Flonase use without much success.  We will transition to Allegra or generic version fexofenadine OTC as well as trying Flonase or Sensimist formula b.i.d. with NSI    - NSI BID  - Flonase or sensimist use BID   - if patient's symptoms have continued at the 6 week atilio we may  consider a CT scan without contrast to evaluate bony structures of the face as the prior MRI is not good for bone evaluation.  - 6 weeks for telemedicine visit.     Jennifer Schulte, MS3       I evaluated and examined patient independently. Agree with St. Dr. Schulte note and made changes where appropriate.     OLGA Goldstein MD  [facility] Otolaryngology PGY III

## 2022-11-30 ENCOUNTER — OFFICE VISIT (OUTPATIENT)
Dept: BEHAVIORAL HEALTH | Facility: CLINIC | Age: 27
End: 2022-11-30
Payer: MEDICAID

## 2022-11-30 VITALS
HEART RATE: 81 BPM | WEIGHT: 255.19 LBS | SYSTOLIC BLOOD PRESSURE: 136 MMHG | BODY MASS INDEX: 43.8 KG/M2 | DIASTOLIC BLOOD PRESSURE: 80 MMHG | TEMPERATURE: 98 F | OXYGEN SATURATION: 100 %

## 2022-11-30 DIAGNOSIS — F41.1 GAD (GENERALIZED ANXIETY DISORDER): Primary | ICD-10-CM

## 2022-11-30 DIAGNOSIS — F33.1 MODERATE EPISODE OF RECURRENT MAJOR DEPRESSIVE DISORDER: ICD-10-CM

## 2022-11-30 LAB
AMPHET UR QL SCN: NEGATIVE
BARBITURATE SCN PRESENT UR: NEGATIVE
BENZODIAZ UR QL SCN: NEGATIVE
CANNABINOIDS UR QL SCN: NEGATIVE
COCAINE UR QL SCN: NEGATIVE
FENTANYL UR QL SCN: NEGATIVE
MDMA UR QL SCN: NEGATIVE
OPIATES UR QL SCN: NEGATIVE
PCP UR QL: NEGATIVE
PH UR: 6.5 [PH] (ref 3–11)
SPECIFIC GRAVITY, URINE AUTO (.000) (OHS): 1.02 (ref 1–1.03)
TSH SERPL-ACNC: 0.96 UIU/ML (ref 0.35–4.94)

## 2022-11-30 PROCEDURE — 3075F SYST BP GE 130 - 139MM HG: CPT | Mod: CPTII,,, | Performed by: STUDENT IN AN ORGANIZED HEALTH CARE EDUCATION/TRAINING PROGRAM

## 2022-11-30 PROCEDURE — 3079F DIAST BP 80-89 MM HG: CPT | Mod: CPTII,,, | Performed by: STUDENT IN AN ORGANIZED HEALTH CARE EDUCATION/TRAINING PROGRAM

## 2022-11-30 PROCEDURE — 99213 OFFICE O/P EST LOW 20 MIN: CPT | Mod: PBBFAC,PN | Performed by: STUDENT IN AN ORGANIZED HEALTH CARE EDUCATION/TRAINING PROGRAM

## 2022-11-30 PROCEDURE — 1160F RVW MEDS BY RX/DR IN RCRD: CPT | Mod: CPTII,,, | Performed by: STUDENT IN AN ORGANIZED HEALTH CARE EDUCATION/TRAINING PROGRAM

## 2022-11-30 PROCEDURE — 1160F PR REVIEW ALL MEDS BY PRESCRIBER/CLIN PHARMACIST DOCUMENTED: ICD-10-PCS | Mod: CPTII,,, | Performed by: STUDENT IN AN ORGANIZED HEALTH CARE EDUCATION/TRAINING PROGRAM

## 2022-11-30 PROCEDURE — 1159F MED LIST DOCD IN RCRD: CPT | Mod: CPTII,,, | Performed by: STUDENT IN AN ORGANIZED HEALTH CARE EDUCATION/TRAINING PROGRAM

## 2022-11-30 PROCEDURE — 3075F PR MOST RECENT SYSTOLIC BLOOD PRESS GE 130-139MM HG: ICD-10-PCS | Mod: CPTII,,, | Performed by: STUDENT IN AN ORGANIZED HEALTH CARE EDUCATION/TRAINING PROGRAM

## 2022-11-30 PROCEDURE — 80307 DRUG TEST PRSMV CHEM ANLYZR: CPT | Performed by: STUDENT IN AN ORGANIZED HEALTH CARE EDUCATION/TRAINING PROGRAM

## 2022-11-30 PROCEDURE — 3008F BODY MASS INDEX DOCD: CPT | Mod: CPTII,,, | Performed by: STUDENT IN AN ORGANIZED HEALTH CARE EDUCATION/TRAINING PROGRAM

## 2022-11-30 PROCEDURE — 3008F PR BODY MASS INDEX (BMI) DOCUMENTED: ICD-10-PCS | Mod: CPTII,,, | Performed by: STUDENT IN AN ORGANIZED HEALTH CARE EDUCATION/TRAINING PROGRAM

## 2022-11-30 PROCEDURE — 99204 PR OFFICE/OUTPT VISIT, NEW, LEVL IV, 45-59 MIN: ICD-10-PCS | Mod: S$PBB,,, | Performed by: STUDENT IN AN ORGANIZED HEALTH CARE EDUCATION/TRAINING PROGRAM

## 2022-11-30 PROCEDURE — 99204 OFFICE O/P NEW MOD 45 MIN: CPT | Mod: S$PBB,,, | Performed by: STUDENT IN AN ORGANIZED HEALTH CARE EDUCATION/TRAINING PROGRAM

## 2022-11-30 PROCEDURE — 1159F PR MEDICATION LIST DOCUMENTED IN MEDICAL RECORD: ICD-10-PCS | Mod: CPTII,,, | Performed by: STUDENT IN AN ORGANIZED HEALTH CARE EDUCATION/TRAINING PROGRAM

## 2022-11-30 PROCEDURE — 3079F PR MOST RECENT DIASTOLIC BLOOD PRESSURE 80-89 MM HG: ICD-10-PCS | Mod: CPTII,,, | Performed by: STUDENT IN AN ORGANIZED HEALTH CARE EDUCATION/TRAINING PROGRAM

## 2022-11-30 PROCEDURE — 36415 COLL VENOUS BLD VENIPUNCTURE: CPT | Performed by: STUDENT IN AN ORGANIZED HEALTH CARE EDUCATION/TRAINING PROGRAM

## 2022-11-30 PROCEDURE — 84443 ASSAY THYROID STIM HORMONE: CPT | Performed by: STUDENT IN AN ORGANIZED HEALTH CARE EDUCATION/TRAINING PROGRAM

## 2022-11-30 RX ORDER — DULOXETIN HYDROCHLORIDE 60 MG/1
CAPSULE, DELAYED RELEASE ORAL
Qty: 30 CAPSULE | Refills: 5 | Status: SHIPPED | OUTPATIENT
Start: 2022-11-30 | End: 2023-03-30 | Stop reason: SDUPTHER

## 2022-11-30 RX ORDER — DULOXETIN HYDROCHLORIDE 30 MG/1
CAPSULE, DELAYED RELEASE ORAL
Qty: 30 CAPSULE | Refills: 5 | Status: SHIPPED | OUTPATIENT
Start: 2022-11-30 | End: 2023-03-30 | Stop reason: SDUPTHER
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
## 2023-01-12 ENCOUNTER — OFFICE VISIT (OUTPATIENT)
Dept: OTOLARYNGOLOGY | Facility: CLINIC | Age: 28
End: 2023-01-12
Payer: MEDICAID

## 2023-01-12 VITALS — HEART RATE: 86 BPM | SYSTOLIC BLOOD PRESSURE: 124 MMHG | DIASTOLIC BLOOD PRESSURE: 80 MMHG

## 2023-01-12 DIAGNOSIS — R51.9 FACIAL PAIN: ICD-10-CM

## 2023-01-12 DIAGNOSIS — J31.0 RHINITIS, UNSPECIFIED TYPE: ICD-10-CM

## 2023-01-12 DIAGNOSIS — G43.709 CHRONIC MIGRAINE WITHOUT AURA WITHOUT STATUS MIGRAINOSUS, NOT INTRACTABLE: ICD-10-CM

## 2023-01-12 DIAGNOSIS — J34.1 MAXILLARY SINUS CYST: Primary | ICD-10-CM

## 2023-01-12 PROCEDURE — 99213 OFFICE O/P EST LOW 20 MIN: CPT | Mod: PBBFAC | Performed by: STUDENT IN AN ORGANIZED HEALTH CARE EDUCATION/TRAINING PROGRAM

## 2023-01-12 NOTE — PROGRESS NOTES
[facility]  Otolaryngology Clinic Note    Cassandra Escobedo  Encounter Date: 1/12/2023  YOB: 1995  Physician:     Chief Complaint: Headaches    HPI: Cassandra Escobedo is a 27 y.o. female with pmh of anxiety and depression who presents to clinic for evaluation of headaches and sinusitis. She states that the headaches began 2 years ago and have increased in intensity over the last year.  Pain begins on the rt side of the face and moves to the lt. The headaches are associated with rhinorrhea, tinnitus, N/V, watery eyes and dizziness. She has also been experiencing new onset epistaxis (about 5 in the last several weeks). She has been given propanolol in the past for the headaches but states that it does not provide relief. The headaches are triggered by caffeine, alcohol, chocolate and stress. MRI performed on 10/17/22 showed a left maxillary sinus mucosal retention cyst w/ no intracranial abnormalities. She has used Flonase and cetirizine in the past with minimal relief. Pertinent family history includes migraines (maternal aunt) and allergic rhinitis (mother). Referral to neurology pending.     1/12/23:  Here today for follow-up.  Patient states she is feeling better since last seen.  She is having her headache and facial every couple of days every day.  She still does have fairly frequent migraines. She has neurology appt. She has been using flonse, nasal saline, and antishistamines with mild improvement. Still endorses occasional rhinorrhea and facial pain. Sense of smell is okay. No nasal obstruction and can breathe well through nose.       ROS:   General: Negative except per HPI  Skin: Denies rash, ulcer, or lesion.  Eyes: Denies vision changes or diplopia.  Ears: Negative except per HPI  Nose: Negative except per HPI  Throat/mouth: Negative except per HPI  Cardiovascular: Negative except per HPI  Respiratory: Negative except per HPI  Neck: Negative except per HPI  Endocrine:  Negative except per HPI  Neurologic: Negative except per HPI    Other 10-point review of systems negative except per HPI      Review of patient's allergies indicates:   Allergen Reactions    Phenergan plain     Sulfa (sulfonamide antibiotics)        Past Medical History:   Diagnosis Date    Hypertension        Past Surgical History:   Procedure Laterality Date     SECTION  2018    ELBOW SURGERY Right 2000       Social History     Socioeconomic History    Marital status: Single   Tobacco Use    Smoking status: Some Days     Types: Cigarettes    Smokeless tobacco: Never   Substance and Sexual Activity    Alcohol use: Yes     Comment: occassionally    Drug use: Yes     Types: Marijuana    Sexual activity: Not Currently       Family History   Problem Relation Age of Onset    Hyperlipidemia Mother     Hypertension Mother     Diabetes Mother     Hyperlipidemia Father        Outpatient Encounter Medications as of 2023   Medication Sig Dispense Refill    cetirizine (ZYRTEC) 10 MG tablet   See Instructions, TAKE ONE TABLET BY MOUTH EVERY DAY, # 30 tab(s), 1 Refill(s), Pharmacy: [facility], 163, cm, Height/Length Dosing, 22 11:37:00 CDT, 113.1, kg, Weight Dosing, 22 11:37:00 CDT      DULoxetine (CYMBALTA) 30 MG capsule Take 30mg+ 60mg by mouth daily. 30 capsule 5    DULoxetine (CYMBALTA) 60 MG capsule Take 30mg+ 60mg by mouth daily. 30 capsule 5    ibuprofen (ADVIL,MOTRIN) 800 MG tablet Take 800 mg by mouth.      propranoloL (INDERAL) 80 MG tablet Take 80 mg by mouth 2 (two) times daily.       No facility-administered encounter medications on file as of 2023.       Physical Exam:  Vitals:    23 1449   BP: 124/80   Pulse: 86       Constitutional  General Appearance: well nourished, well-developed, AAO x3, NAD  HEENT  Eyes: EOMI, normal conjunctivae  Ears: Hearing well at conversation level   AD: auricle normal, EAC normal, TM intact, no EUGENE   AS: auricle normal, EAC normal, TM intact,  no EUGENE   Vestibular: ambulates without gait disturbance  Nose: septum midline with swell body noted , mild inferior turbinate hypertrophy, no polyps, mucosa with erythema  OC/OP: dentition moderate, no oral lesions, tongue/FOM/BOT- soft, no leukoplakia/ulcerations/ tenderness, tonsils +  Nasopharynx, Hypopharynx, and Larynx:    Indirect: attempted, limited view due to patient intolerance  Neck: soft, non-tender, no palpable lymph nodes   Thyroid region- no nodules or goiter  Neuro: CN II - XII intact bilaterally  Respiratory: non-labored respirations  Psychiatric: oriented to time, place and person, no depression, anxiety or agitation    Procedures:Flexible Fiberoptic Nasopharyngoscopy via bilateral nare (PRIOR VISIT)    Procedure in Detail: Informed consent was obtained from the patient after explanation of procedure, indications, risks and benefits. Flexible endoscopy was performed through the nasal passages. The nasal cavity, nasopharynx.    Anesthesia: Topical Neosynephrine / Tetracaine  Adverse Events: None  Resident Surgeon: OLGA Goldstein MD   Blood loss: none  Condition: good    Findings:  NP/OP: no masses/lesions of NC, eustachian tube, fossa of Rosenmuller, no adenoid hypertrophy  Swell body noted at anterior septum with some contact with bilateral middle turbinate  Middle meatus open bilaterally  No purulence noted  No masses or lesions noted  Mild inferior turbinate hypertrophy bilaterally without rhinorrhea.  Nasopharynx clear without any mass or lesion.      Pertinent Data:  ? LABS:  ? AUDIO:  ? CT Scan:    Imaging:   I personally reviewed the following images:    Summary of Outside Records:      Assessment/Plan:  Cassandra Escobedo is a 27 y.o. female with pmh of anxiety and depression who presents to clinic for evaluation of headaches and rhinitis, congestion. Patient symptoms of headache associated with eye watering and rhinorrhea likely a migraine. She does have a significant maxillary  cyst/inflammation noted on MRI. Does endorse intermittent rhinorrhea and facial pain.    - continue flonase, saline intranasal, and anithistamines  - will order CT sinus   - RTC 4-5 weeks      Damon Powell MD  [facility] Otolaryngology PGY V

## 2023-01-17 NOTE — PROGRESS NOTES
I reviewed the history and physical exam with the resident.   I agree with findings and plan.    Poncho Flores M.D.

## 2023-02-01 ENCOUNTER — HOSPITAL ENCOUNTER (EMERGENCY)
Facility: HOSPITAL | Age: 28
Discharge: HOME | End: 2023-02-01
Attending: FAMILY MEDICINE
Payer: MEDICAID

## 2023-02-01 VITALS
WEIGHT: 247 LBS | TEMPERATURE: 98 F | SYSTOLIC BLOOD PRESSURE: 132 MMHG | HEIGHT: 64 IN | DIASTOLIC BLOOD PRESSURE: 93 MMHG | RESPIRATION RATE: 19 BRPM | OXYGEN SATURATION: 99 % | BODY MASS INDEX: 42.17 KG/M2 | HEART RATE: 106 BPM

## 2023-02-01 DIAGNOSIS — M79.605 LEG PAIN, ANTERIOR, LEFT: ICD-10-CM
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
## 2023-02-01 PROCEDURE — 25000003 PHARM REV CODE 250: Performed by: NURSE PRACTITIONER

## 2023-02-01 PROCEDURE — 99283 EMERGENCY DEPT VISIT LOW MDM: CPT | Mod: 25

## 2023-02-01 RX ORDER — ACETAMINOPHEN AND CODEINE PHOSPHATE 300; 30 MG/1; MG/1
1 TABLET ORAL
Status: DISCONTINUED | OUTPATIENT
Start: 2023-02-01 | End: 2023-02-01

## 2023-02-01 RX ORDER — HYDROCODONE BITARTRATE AND ACETAMINOPHEN 5; 325 MG/1; MG/1
1 TABLET ORAL
Status: COMPLETED | OUTPATIENT
Start: 2023-02-01 | End: 2023-02-01

## 2023-02-01 RX ADMIN — HYDROCODONE BITARTRATE AND ACETAMINOPHEN 1 TABLET: 5; 325 TABLET ORAL at 03:02
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
## 2023-02-02 ENCOUNTER — HOSPITAL ENCOUNTER (OUTPATIENT)
Dept: RADIOLOGY | Facility: HOSPITAL | Age: 28
Discharge: HOME | End: 2023-02-02
Attending: STUDENT IN AN ORGANIZED HEALTH CARE EDUCATION/TRAINING PROGRAM
Payer: MEDICAID

## 2023-02-02 DIAGNOSIS — J34.1 MAXILLARY SINUS CYST: ICD-10-CM

## 2023-02-02 DIAGNOSIS — J31.0 RHINITIS, UNSPECIFIED TYPE: ICD-10-CM

## 2023-02-02 DIAGNOSIS — R51.9 FACIAL PAIN: ICD-10-CM

## 2023-02-02 PROCEDURE — 70486 CT MAXILLOFACIAL W/O DYE: CPT | Mod: TC

## 2023-02-23 ENCOUNTER — OFFICE VISIT (OUTPATIENT)
Dept: OTOLARYNGOLOGY | Facility: CLINIC | Age: 28
End: 2023-02-23
Payer: MEDICAID

## 2023-02-23 VITALS
SYSTOLIC BLOOD PRESSURE: 129 MMHG | DIASTOLIC BLOOD PRESSURE: 78 MMHG | HEART RATE: 103 BPM | BODY MASS INDEX: 44.29 KG/M2 | WEIGHT: 258 LBS | TEMPERATURE: 98 F

## 2023-02-23 DIAGNOSIS — G43.709 CHRONIC MIGRAINE WITHOUT AURA WITHOUT STATUS MIGRAINOSUS, NOT INTRACTABLE: Primary | ICD-10-CM

## 2023-02-23 DIAGNOSIS — J32.9 CHRONIC RHINOSINUSITIS: ICD-10-CM

## 2023-02-23 PROCEDURE — 99213 OFFICE O/P EST LOW 20 MIN: CPT | Mod: PBBFAC

## 2023-02-23 NOTE — PROGRESS NOTES
[facility]  Otolaryngology Clinic Note    Cassandra Escobedo  Encounter Date: 2/23/2023  YOB: 1995  Physician:     Chief Complaint: Headaches    HPI: Cassandra Escobedo is a 28 y.o. female with pmh of anxiety and depression who presents to clinic for evaluation of headaches and sinusitis. She states that the headaches began 2 years ago and have increased in intensity over the last year.  Pain begins on the rt side of the face and moves to the lt. The headaches are associated with rhinorrhea, tinnitus, N/V, watery eyes and dizziness. She has also been experiencing new onset epistaxis (about 5 in the last several weeks). She has been given propanolol in the past for the headaches but states that it does not provide relief. The headaches are triggered by caffeine, alcohol, chocolate and stress. MRI performed on 10/17/22 showed a left maxillary sinus mucosal retention cyst w/ no intracranial abnormalities. She has used Flonase and cetirizine in the past with minimal relief. Pertinent family history includes migraines (maternal aunt) and allergic rhinitis (mother). Referral to neurology pending.     1/12/23:  Here today for follow-up.  Patient states she is feeling better since last seen.  She is having her headache and facial every couple of days every day.  She still does have fairly frequent migraines. She has neurology appt. She has been using flonse, nasal saline, and antishistamines with mild improvement. Still endorses occasional rhinorrhea and facial pain. Sense of smell is okay. No nasal obstruction and can breathe well through nose.     2/23/23:  Presents today for follow up of her chronic rhinosinusitis.  She is doing well today.  Only complaint is intermittent sharp pains in the frontal area usually associated with her migraines.  She denies nasal congestion, rhinorrhea, hyposmia, post-nasal drip, and facial pain over the maxillary isnuses.  She has been using flonase,  nasal saline and antihistamines.    ROS:   General: Negative except per HPI  Skin: Denies rash, ulcer, or lesion.  Eyes: Denies vision changes or diplopia.  Ears: Negative except per HPI  Nose: Negative except per HPI  Throat/mouth: Negative except per HPI  Cardiovascular: Negative except per HPI  Respiratory: Negative except per HPI  Neck: Negative except per HPI  Endocrine: Negative except per HPI  Neurologic: Negative except per HPI    Other 10-point review of systems negative except per HPI      Review of patient's allergies indicates:   Allergen Reactions    Phenergan plain     Promethazine     Sulfa (sulfonamide antibiotics)        Past Medical History:   Diagnosis Date    Anxiety disorder, unspecified     Depression     Hypertension     Migraine headache        Past Surgical History:   Procedure Laterality Date     SECTION  2018     SECTION      elbow Right     ELBOW SURGERY Right        Social History     Socioeconomic History    Marital status: Single   Tobacco Use    Smoking status: Some Days     Types: Cigarettes    Smokeless tobacco: Never   Substance and Sexual Activity    Alcohol use: Yes     Comment: occassionally    Drug use: Yes     Types: Marijuana    Sexual activity: Not Currently       Family History   Problem Relation Age of Onset    Hyperlipidemia Mother     Hypertension Mother     Diabetes Mother     Hyperlipidemia Father        Outpatient Encounter Medications as of 2023   Medication Sig Dispense Refill    cetirizine (ZYRTEC) 10 MG tablet   See Instructions, TAKE ONE TABLET BY MOUTH EVERY DAY, # 30 tab(s), 1 Refill(s), Pharmacy: [facility], 163, cm, Height/Length Dosing, 22 11:37:00 CDT, 113.1, kg, Weight Dosing, 22 11:37:00 CDT      DULoxetine (CYMBALTA) 30 MG capsule Take 30mg+ 60mg by mouth daily. 30 capsule 5    DULoxetine (CYMBALTA) 60 MG capsule Take 30mg+ 60mg by mouth daily. 30 capsule 5    propranoloL (INDERAL) 80 MG tablet Take 80 mg by  mouth 2 (two) times daily.      ibuprofen (ADVIL,MOTRIN) 800 MG tablet Take 800 mg by mouth.       No facility-administered encounter medications on file as of 2/23/2023.       Physical Exam:  Vitals:    02/23/23 1211   BP: 129/78   Pulse: 103   Temp: 98.4 °F (36.9 °C)   Weight: 117 kg (258 lb)       Constitutional  General Appearance: well nourished, well-developed, AAO x3, NAD  HEENT  Eyes: EOMI, normal conjunctivae  Ears: Hearing well at conversation level   AD: auricle normal, EAC normal, TM intact, no EUGENE   AS: auricle normal, EAC normal, TM intact, no EUGENE   Vestibular: ambulates without gait disturbance  Nose: septum midline with swell body noted , mild inferior turbinate hypertrophy, no polyps, mucosa with erythema  OC/OP: dentition moderate, no oral lesions, tongue/FOM/BOT- soft, no leukoplakia/ulcerations/ tenderness, tonsils +  Nasopharynx, Hypopharynx, and Larynx:    Indirect: attempted, limited view due to patient intolerance  Neck: soft, non-tender, no palpable lymph nodes   Thyroid region- no nodules or goiter  Neuro: CN II - XII intact bilaterally  Respiratory: non-labored respirations  Psychiatric: oriented to time, place and person, no depression, anxiety or agitation    Procedures:Flexible Fiberoptic Nasopharyngoscopy via bilateral nare (PRIOR VISIT)    Procedure in Detail: Informed consent was obtained from the patient after explanation of procedure, indications, risks and benefits. Flexible endoscopy was performed through the nasal passages. The nasal cavity, nasopharynx.    Anesthesia: Topical Neosynephrine / Tetracaine  Adverse Events: None  Resident Surgeon: OLGA Goldstein MD   Blood loss: none  Condition: good    Findings:  NP/OP: no masses/lesions of NC, eustachian tube, fossa of Rosenmuller, no adenoid hypertrophy  Swell body noted at anterior septum with some contact with bilateral middle turbinate  Middle meatus open bilaterally  No purulence noted  No masses or lesions noted  Mild  inferior turbinate hypertrophy bilaterally without rhinorrhea.  Nasopharynx clear without any mass or lesion.      Pertinent Data:  ? LABS:  ? AUDIO:  ? CT Scan:    Imaging:   I personally reviewed the following images:    CT sinus:  Opacification of the right and left maxillary sinuses will mild narrowing of the osteomeatal complex.  The frontal, ethmoid and sphenoid sinuses are well aerated.  The nasal septum does not have significant deviation to either side.  Inferior turbinates are mildly hypertrophied.    Summary of Outside Records:      Assessment/Plan:  Cassandra Escobedo is a 27 y.o. female with pmh of anxiety, depression, migraines and CRS with predominantly bilateral maxillary sinusitis.  She has been optimized on medical therapy.  Still with intermittent headaches and facial pain.    - Continue flonase, saline intranasal, and anithistamines.  - Discussed surgical management and recommended anterior ethmoidectomy with maxillary antrostomies bilateral.  Also counseled patient that we could not guarantee that surgery with drastically improve her headaches.  Patient is not currently interested in surgery and would like to continue to with medical therapy at this time.   - RTC 6 months to evaluate long term symptom control with medical therapy       Jonathan Buitrago MD  [facility] Otolaryngology PGY III

## 2023-03-30 ENCOUNTER — OFFICE VISIT (OUTPATIENT)
Dept: BEHAVIORAL HEALTH | Facility: CLINIC | Age: 28
End: 2023-03-30
Payer: MEDICAID

## 2023-03-30 VITALS
TEMPERATURE: 98 F | HEART RATE: 98 BPM | OXYGEN SATURATION: 100 % | WEIGHT: 257.5 LBS | BODY MASS INDEX: 44.2 KG/M2 | SYSTOLIC BLOOD PRESSURE: 128 MMHG | DIASTOLIC BLOOD PRESSURE: 80 MMHG

## 2023-03-30 DIAGNOSIS — F33.1 MODERATE EPISODE OF RECURRENT MAJOR DEPRESSIVE DISORDER: Primary | ICD-10-CM

## 2023-03-30 DIAGNOSIS — F41.1 GAD (GENERALIZED ANXIETY DISORDER): ICD-10-CM

## 2023-03-30 PROCEDURE — 99213 OFFICE O/P EST LOW 20 MIN: CPT | Mod: PBBFAC,PN | Performed by: STUDENT IN AN ORGANIZED HEALTH CARE EDUCATION/TRAINING PROGRAM

## 2023-03-30 PROCEDURE — 1160F PR REVIEW ALL MEDS BY PRESCRIBER/CLIN PHARMACIST DOCUMENTED: ICD-10-PCS | Mod: CPTII,,, | Performed by: STUDENT IN AN ORGANIZED HEALTH CARE EDUCATION/TRAINING PROGRAM

## 2023-03-30 PROCEDURE — 3074F PR MOST RECENT SYSTOLIC BLOOD PRESSURE < 130 MM HG: ICD-10-PCS | Mod: CPTII,,, | Performed by: STUDENT IN AN ORGANIZED HEALTH CARE EDUCATION/TRAINING PROGRAM

## 2023-03-30 PROCEDURE — 1159F PR MEDICATION LIST DOCUMENTED IN MEDICAL RECORD: ICD-10-PCS | Mod: CPTII,,, | Performed by: STUDENT IN AN ORGANIZED HEALTH CARE EDUCATION/TRAINING PROGRAM

## 2023-03-30 PROCEDURE — 99213 PR OFFICE/OUTPT VISIT, EST, LEVL III, 20-29 MIN: ICD-10-PCS | Mod: S$PBB,,, | Performed by: STUDENT IN AN ORGANIZED HEALTH CARE EDUCATION/TRAINING PROGRAM

## 2023-03-30 PROCEDURE — 1159F MED LIST DOCD IN RCRD: CPT | Mod: CPTII,,, | Performed by: STUDENT IN AN ORGANIZED HEALTH CARE EDUCATION/TRAINING PROGRAM

## 2023-03-30 PROCEDURE — 1160F RVW MEDS BY RX/DR IN RCRD: CPT | Mod: CPTII,,, | Performed by: STUDENT IN AN ORGANIZED HEALTH CARE EDUCATION/TRAINING PROGRAM

## 2023-03-30 PROCEDURE — 99213 OFFICE O/P EST LOW 20 MIN: CPT | Mod: S$PBB,,, | Performed by: STUDENT IN AN ORGANIZED HEALTH CARE EDUCATION/TRAINING PROGRAM

## 2023-03-30 PROCEDURE — 3079F DIAST BP 80-89 MM HG: CPT | Mod: CPTII,,, | Performed by: STUDENT IN AN ORGANIZED HEALTH CARE EDUCATION/TRAINING PROGRAM

## 2023-03-30 PROCEDURE — 3074F SYST BP LT 130 MM HG: CPT | Mod: CPTII,,, | Performed by: STUDENT IN AN ORGANIZED HEALTH CARE EDUCATION/TRAINING PROGRAM

## 2023-03-30 PROCEDURE — 3008F PR BODY MASS INDEX (BMI) DOCUMENTED: ICD-10-PCS | Mod: CPTII,,, | Performed by: STUDENT IN AN ORGANIZED HEALTH CARE EDUCATION/TRAINING PROGRAM

## 2023-03-30 PROCEDURE — 3008F BODY MASS INDEX DOCD: CPT | Mod: CPTII,,, | Performed by: STUDENT IN AN ORGANIZED HEALTH CARE EDUCATION/TRAINING PROGRAM

## 2023-03-30 PROCEDURE — 3079F PR MOST RECENT DIASTOLIC BLOOD PRESSURE 80-89 MM HG: ICD-10-PCS | Mod: CPTII,,, | Performed by: STUDENT IN AN ORGANIZED HEALTH CARE EDUCATION/TRAINING PROGRAM

## 2023-03-30 RX ORDER — DULOXETIN HYDROCHLORIDE 60 MG/1
CAPSULE, DELAYED RELEASE ORAL
Qty: 30 CAPSULE | Refills: 11 | Status: SHIPPED | OUTPATIENT
Start: 2023-03-30 | End: 2023-10-05 | Stop reason: SDUPTHER

## 2023-03-30 RX ORDER — DULOXETIN HYDROCHLORIDE 30 MG/1
CAPSULE, DELAYED RELEASE ORAL
Qty: 30 CAPSULE | Refills: 11 | Status: SHIPPED | OUTPATIENT
Start: 2023-03-30 | End: 2023-10-05 | Stop reason: SDUPTHER
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
## 2023-04-28 ENCOUNTER — PATIENT MESSAGE (OUTPATIENT)
Dept: INTERNAL MEDICINE | Facility: CLINIC | Age: 28
End: 2023-04-28
Payer: MEDICAID

## 2023-05-05 ENCOUNTER — LAB VISIT (OUTPATIENT)
Dept: LAB | Facility: HOSPITAL | Age: 28
End: 2023-05-05
Attending: NURSE PRACTITIONER
Payer: MEDICAID

## 2023-05-05 DIAGNOSIS — R73.03 PREDIABETES: ICD-10-CM

## 2023-05-05 LAB
ANION GAP SERPL CALC-SCNC: 5 MEQ/L (ref 2–13)
APPEARANCE UR: CLEAR
BACTERIA #/AREA URNS AUTO: ABNORMAL /HPF
BILIRUB UR QL STRIP.AUTO: NEGATIVE MG/DL
BUN SERPL-MCNC: 13 MG/DL (ref 7–20)
CALCIUM SERPL-MCNC: 9.3 MG/DL (ref 8.4–10.2)
CHLORIDE SERPL-SCNC: 107 MMOL/L (ref 98–110)
CO2 SERPL-SCNC: 30 MMOL/L (ref 21–32)
COLOR UR AUTO: YELLOW
CREAT SERPL-MCNC: 0.52 MG/DL (ref 0.66–1.25)
CREAT/UREA NIT SERPL: 25 (ref 12–20)
EST. AVERAGE GLUCOSE BLD GHB EST-MCNC: 157.1 MG/DL (ref 70–115)
GFR SERPLBLD CREATININE-BSD FMLA CKD-EPI: >90 MLS/MIN/1.73/M2
GLUCOSE SERPL-MCNC: 155 MG/DL (ref 70–115)
GLUCOSE UR QL STRIP.AUTO: NEGATIVE MG/DL
HBA1C MFR BLD: 7.1 % (ref 4–6)
KETONES UR QL STRIP.AUTO: NEGATIVE MG/DL
LEUKOCYTE ESTERASE UR QL STRIP.AUTO: NEGATIVE UNIT/L
NITRITE UR QL STRIP.AUTO: NEGATIVE
PH UR STRIP.AUTO: 7 [PH]
POTASSIUM SERPL-SCNC: 4 MMOL/L (ref 3.5–5.1)
PROT UR QL STRIP.AUTO: 30 MG/DL
RBC #/AREA URNS AUTO: ABNORMAL /HPF
RBC UR QL AUTO: ABNORMAL UNIT/L
SODIUM SERPL-SCNC: 142 MMOL/L (ref 135–145)
SP GR UR STRIP.AUTO: 1.02
SQUAMOUS #/AREA URNS AUTO: ABNORMAL /HPF
UROBILINOGEN UR STRIP-ACNC: 0.2 MG/DL
WBC #/AREA URNS AUTO: ABNORMAL /HPF

## 2023-05-05 PROCEDURE — 83036 HEMOGLOBIN GLYCOSYLATED A1C: CPT

## 2023-05-05 PROCEDURE — 87077 CULTURE AEROBIC IDENTIFY: CPT | Mod: 91

## 2023-05-05 PROCEDURE — 82043 UR ALBUMIN QUANTITATIVE: CPT

## 2023-05-05 PROCEDURE — 36415 COLL VENOUS BLD VENIPUNCTURE: CPT

## 2023-05-05 PROCEDURE — 87088 URINE BACTERIA CULTURE: CPT

## 2023-05-05 PROCEDURE — 80048 BASIC METABOLIC PNL TOTAL CA: CPT

## 2023-05-05 PROCEDURE — 81001 URINALYSIS AUTO W/SCOPE: CPT

## 2023-05-06 LAB
CREAT UR-MCNC: 146.2 MG/DL (ref 47–110)
MICROALBUMIN UR-MCNC: 53.2 UG/ML
MICROALBUMIN/CREAT RATIO PNL UR: 36.4 MG/GM CR (ref 0–30)

## 2023-05-08 ENCOUNTER — TELEPHONE (OUTPATIENT)
Dept: INTERNAL MEDICINE | Facility: CLINIC | Age: 28
End: 2023-05-08
Payer: MEDICAID

## 2023-05-08 DIAGNOSIS — N39.0 URINARY TRACT INFECTION WITH HEMATURIA, SITE UNSPECIFIED: Primary | ICD-10-CM

## 2023-05-08 DIAGNOSIS — R31.9 URINARY TRACT INFECTION WITH HEMATURIA, SITE UNSPECIFIED: Primary | ICD-10-CM

## 2023-05-08 LAB — BACTERIA UR CULT: ABNORMAL

## 2023-05-08 RX ORDER — NITROFURANTOIN 25; 75 MG/1; MG/1
100 CAPSULE ORAL 2 TIMES DAILY
Qty: 14 CAPSULE | Refills: 0 | Status: SHIPPED | OUTPATIENT
Start: 2023-05-08 | End: 2023-05-15

## 2023-05-08 NOTE — TELEPHONE ENCOUNTER
I contacted patient and informed provider reviewed urine sample with UTI. New order anti biotic sent to pharmacy   Precautions listed below. Patient voiced understanding.  Other labs to be reviewed NOV May 19 virtual.   
Please inform the patient that while we will discuss her lab work results at her upcoming Office visit, her urine sample did grow out a bacteria indicating a UTI. I have sent a prescription for her to the pharmacy for her to start taking. Also, advise the following.    Start antibiotics as prescribed.   Complete the full course of the medication.  Report any continuing signs such as nausea/vomiting,visible blood in urine, increased low back or flank pain, worsening burning upon urination after antibiotic completion or fever.  Drink plenty of water.  Avoid soda or carbonated beverages.   Urinate frequently; do not hold urine for extended periods of time.  Wear cotton underwear, avoid tight fitting pants.  Use OTC AZO or pyridium for relief of urinary spasms.  Women: wipe front to back, urinate after sexual intercourse, and avoid scented or irritating feminine products.    Thanks,    Leyda Sanders, GRACY     
no

## 2023-05-18 PROBLEM — E11.9 TYPE 2 DIABETES MELLITUS WITHOUT COMPLICATION, WITHOUT LONG-TERM CURRENT USE OF INSULIN: Status: ACTIVE | Noted: 2023-05-18

## 2023-05-18 NOTE — ASSESSMENT & PLAN NOTE
A1C 7.1  Start RX Metformin 500 mg BID  2 month f/u with labs via virtual   Referral to Diabetes Education

## 2023-05-18 NOTE — PROGRESS NOTES
GRACY Ambrosio   [facility]  [address] [facility] [address]      PATIENT NAME: Cassandra Escobedo  : 1995  DATE: 23  MRN: [id number]      Reason for Visit / Chief Complaint: Follow-up (Lab results)       History of Present Illness / Problem Focused Workflow     Cassandra Escobedo presents to the clinic with Follow-up (Lab results)     29 yo WF here today for f/u. PMH T2DM, Migraines & anxiety.     Cervical Cancer Screening: [facility] - F/U   Osteoporosis Screening: 10/03/22  HCV Screening: 10/03/22    2023  Pt here today for routine f/u via virtual for lab review. Pt A1C has increased since LOV to 7.1 in Diabetic range. We had discussed about 6 months ago lifestyle and diet modifications. Pt understands at this time we should start Metformin 500 mg daily and evaluate again in 3 months, denies any other issues today. Agreeable to Diabetes Education.   Denies chest pain, shortness of breath, cough, fever, headache, dizziness, weakness, abdominal pain, nausea, vomiting, diarrhea, constipation, black/bloody stools, unplanned weight loss, night sweats, changes in urinary patterns, burning/odor with urination, depression, anxiety, and SI/HI.           Review of Systems     Review of Systems   Constitutional: Negative.    HENT: Negative.  Negative for hearing loss, rhinorrhea and trouble swallowing.    Eyes: Negative.  Negative for discharge and visual disturbance.   Respiratory: Negative.  Negative for chest tightness and wheezing.    Cardiovascular: Negative.  Negative for chest pain and palpitations.   Gastrointestinal: Negative.  Negative for blood in stool, constipation, diarrhea and vomiting.   Endocrine: Negative.  Negative for polyuria.   Genitourinary: Negative.  Negative for difficulty urinating, dysuria, hematuria and menstrual problem.   Musculoskeletal:  Positive for arthralgias and joint swelling. Negative for neck pain.    Neurological: Negative.  Negative for weakness.   Psychiatric/Behavioral: Negative.  Negative for confusion.        Medications and Allergies     Medications  Medication List with Changes/Refills   New Medications    METFORMIN (GLUCOPHAGE) 500 MG TABLET    Take 1 tablet (500 mg total) by mouth 2 (two) times daily with meals. For Diabetes   Current Medications    CETIRIZINE (ZYRTEC) 10 MG TABLET      See Instructions, TAKE ONE TABLET BY MOUTH EVERY DAY, # 30 tab(s), 1 Refill(s), Pharmacy: [facility], 163, cm, Height/Length Dosing, 07/05/22 11:37:00 CDT, 113.1, kg, Weight Dosing, 07/05/22 11:37:00 CDT    DULOXETINE (CYMBALTA) 30 MG CAPSULE    Take 30mg+ 60mg by mouth daily.    DULOXETINE (CYMBALTA) 60 MG CAPSULE    Take 30mg+ 60mg by mouth daily.    IBUPROFEN (ADVIL,MOTRIN) 800 MG TABLET    Take 800 mg by mouth.    PROPRANOLOL (INDERAL) 80 MG TABLET    Take 80 mg by mouth 2 (two) times daily.         Allergies  Review of patient's allergies indicates:   Allergen Reactions    Phenergan plain     Promethazine     Sulfa (sulfonamide antibiotics)        Physical Examination   There were no vitals filed for this visit.  Physical Exam  HENT:      Right Ear: Hearing normal.      Left Ear: Hearing normal.   Neurological:      Mental Status: She is alert and oriented to person, place, and time.   Psychiatric:         Mood and Affect: Mood normal.         Results     Lab Results   Component Value Date    WBC 5.9 10/03/2022    RBC 5.18 10/03/2022    HGB 13.7 10/03/2022    HCT 44.4 10/03/2022    MCV 85.7 10/03/2022    MCH 26.4 (L) 10/03/2022    MCHC 30.9 (L) 10/03/2022    RDW 13.9 10/03/2022     10/03/2022    MPV 11.4 (H) 10/03/2022     Sodium Level   Date Value Ref Range Status   05/05/2023 142 135 - 145 mmol/L Final     Potassium Level   Date Value Ref Range Status   05/05/2023 4.0 3.5 - 5.1 mmol/L Final     Carbon Dioxide   Date Value Ref Range Status   05/05/2023 30 21 - 32 mmol/L Final     Blood Urea Nitrogen   Date  Value Ref Range Status   05/05/2023 13.0 7.0 - 20.0 mg/dL Final     Creatinine   Date Value Ref Range Status   05/05/2023 0.52 (L) 0.66 - 1.25 mg/dL Final     Calcium Level Total   Date Value Ref Range Status   05/05/2023 9.3 8.4 - 10.2 mg/dL Final     Albumin Level   Date Value Ref Range Status   10/03/2022 3.8 3.5 - 5.0 gm/dL Final     Bilirubin Total   Date Value Ref Range Status   10/03/2022 0.5 <=1.5 mg/dL Final     Alkaline Phosphatase   Date Value Ref Range Status   10/03/2022 63 40 - 150 unit/L Final     Aspartate Aminotransferase   Date Value Ref Range Status   10/03/2022 26 5 - 34 unit/L Final     Alanine Aminotransferase   Date Value Ref Range Status   10/03/2022 73 (H) 0 - 55 unit/L Final     eGFR   Date Value Ref Range Status   09/15/2021 159 mL/min/1.73 m2 Final     Lab Results   Component Value Date    CHOL 188 10/03/2022     Lab Results   Component Value Date    HDL 42 10/03/2022     No results found for: LDLCALC  Lab Results   Component Value Date    TRIG 126 10/03/2022     No results found for: CHOLHDL  Lab Results   Component Value Date    TSH 0.9585 11/30/2022     Lab Results   Component Value Date    PROTEINUA 30 (A) 05/05/2023    LEUKOCYTESUR Negative 05/05/2023     Lab Results   Component Value Date    HGBA1C 7.1 (H) 05/05/2023    HGBA1C 6.4 10/03/2022    HGBA1C 6.0 09/15/2021           Assessment         ICD-10-CM ICD-9-CM   1. Type 2 diabetes mellitus without complication, without long-term current use of insulin  E11.9 250.00       Plan      Problem List Items Addressed This Visit          Endocrine    Type 2 diabetes mellitus without complication, without long-term current use of insulin - Primary    Overview     Follow ADA diet.  Avoid soda, simple sweets, and limit rice/pasta/bread/starches and consume brown options when possible.   Maintain healthy weight with BMI goal <30.   Perform aerobic exercise for 150 minutes per week (or 5 days a week for 30 minutes each day).   Examine feet  daily.              Current Assessment & Plan     A1C 7.1  Start RX Metformin 500 mg BID  2 month f/u with labs via virtual   Referral to Diabetes Education            Relevant Medications    metFORMIN (GLUCOPHAGE) 500 MG tablet    Other Relevant Orders    Urinalysis, Reflex to Urine Culture    Microalbumin/Creatinine Ratio, Urine    Hemoglobin A1C    Basic Metabolic Panel    Ambulatory referral/consult to Diabetes Education       Future Appointments   Date Time Provider Department Center   8/18/2023 10:00 AM Adilene Lancaster MD [facility] NEURO [facility]   8/23/2023  1:00 PM RESIDENT 2, [facility] OTORHINOLARYNGOLOGY [facility] ENT [facility]   9/28/2023  8:30 AM Leo Chaney MD [facility]            Signature:     [facility]  [address] [facility] [address]    Date of encounter: 5/19/23    Audio Only Telehealth Visit     The patient location is: home  The chief complaint leading to consultation is: T2DM   Visit type: Virtual visit with audio only (telephone)  Total time spent with patient: 21 minutes      The reason for the audio only service rather than synchronous audio and video virtual visit was related to technical difficulties or patient preference/necessity.     Each patient to whom I provide medical services by telemedicine is:  (1) informed of the relationship between the physician and patient and the respective role of any other health care provider with respect to management of the patient; and (2) notified that they may decline to receive medical services by telemedicine and may withdraw from such care at any time. Patient verbally consented to receive this service via voice-only telephone call.     This service was not originating from a related E/M service provided within the previous 7 days nor will  to an E/M service or procedure within the next 24 hours or my soonest available appointment.  Prevailing standard of care was  able to be met in this audio-only visit.

## 2023-05-19 ENCOUNTER — OFFICE VISIT (OUTPATIENT)
Dept: INTERNAL MEDICINE | Facility: CLINIC | Age: 28
End: 2023-05-19
Payer: MEDICAID

## 2023-05-19 DIAGNOSIS — E11.9 TYPE 2 DIABETES MELLITUS WITHOUT COMPLICATION, WITHOUT LONG-TERM CURRENT USE OF INSULIN: Primary | ICD-10-CM

## 2023-05-19 PROCEDURE — 99214 OFFICE O/P EST MOD 30 MIN: CPT | Mod: 95,,, | Performed by: NURSE PRACTITIONER

## 2023-05-19 PROCEDURE — 99214 PR OFFICE/OUTPT VISIT, EST, LEVL IV, 30-39 MIN: ICD-10-PCS | Mod: 95,,, | Performed by: NURSE PRACTITIONER

## 2023-05-19 PROCEDURE — 1159F PR MEDICATION LIST DOCUMENTED IN MEDICAL RECORD: ICD-10-PCS | Mod: CPTII,95,, | Performed by: NURSE PRACTITIONER

## 2023-05-19 PROCEDURE — 1159F MED LIST DOCD IN RCRD: CPT | Mod: CPTII,95,, | Performed by: NURSE PRACTITIONER

## 2023-05-19 RX ORDER — METFORMIN HYDROCHLORIDE 500 MG/1
500 TABLET ORAL 2 TIMES DAILY WITH MEALS
Qty: 60 TABLET | Refills: 3 | Status: SHIPPED | OUTPATIENT
Start: 2023-05-19 | End: 2023-12-21 | Stop reason: SDUPTHER

## 2023-07-10 ENCOUNTER — PATIENT MESSAGE (OUTPATIENT)
Dept: ADMINISTRATIVE | Facility: HOSPITAL | Age: 28
End: 2023-07-10
Payer: MEDICAID

## 2023-07-27 ENCOUNTER — DOCUMENTATION ONLY (OUTPATIENT)
Dept: INTERNAL MEDICINE | Facility: CLINIC | Age: 28
End: 2023-07-27
Payer: MEDICAID

## 2023-08-15 ENCOUNTER — PATIENT MESSAGE (OUTPATIENT)
Dept: INTERNAL MEDICINE | Facility: CLINIC | Age: 28
End: 2023-08-15
Payer: MEDICAID

## 2023-08-16 NOTE — PROGRESS NOTES
[facility] Neurology Initial Office Visit Note    Initial Visit Date: 8/18/2023  Current Visit Date:  08/18/2023    Chief Complaint:     Chief Complaint   Patient presents with    Patient presents today with a hx of frequent migraines     Patient states that she is currently out of medication that can't be filled, due to needing to be seen by Neuro. Patient states she has a migraines daily and she has also been suffering with nose bleeds.       History of Present Illness:      This is 28 y.o. female with history of anxiety, type 2 diabetes, allergic rhinitis who is referred for headache disorder.    Age of Onset : 9 years old     Headache Description:   right frontotemporal, pounding, severe, impeding day to day activity, lasting up to 7 days, with nausea and photophobia  Bifrontal, dull, moderate, not impeding day to day activity, constant with nausea     Frequency: 4-8 migraine headache days per month while on propranolol.      Provocation Factors: not on propranolol     Risk Factors  - Family history of headache disorder: Yes maternal grandmother and mother with headache.  - History of focal CNS lesions: No  - History of CNS infections: No  - History head trauma: No  - History of underlying mood disorder: Yes anxiety disorder.  Follows with Dr. Chaney.  - History of sleep disorder: Yes not well  - Recreational drug use: Yes marijuana  - Tobacco use: Yes occasional  - Alcohol use: Yes occasional  - Weight fluctuation: Yes dropped 10 lbs for the past 1 month.  - Isotretinoin or Tetracycline use:  No  - Family planning and contraceptive use: Yes not sexually active. However, does plan on becoming pregnant when she becomes sexually active in the future.      Medications:     Current Prophylactic  Duloxetine 90 mg daily (11/30/2022 to present)  Propanolol 80 mg twice a day (10/3/2022 to present): effective     Current Abortive  Ibuprofen 800 mg 3 times a day as needed: ineffective.     Prior Prophylactic  Denied     Prior  Abortive  Denied    Devices:     - VNS:  - TNS  - TMS:     Procedures:     - Botox:  - PSG block:   - Occipital nerve block:     Labs:     Results for orders placed or performed in visit on 07/27/23    PAP SMEAR   Result Value Ref Range    PAP Recommendation External No follow-up frequency specified        Studies:     - MRI Brain without contrast 10/17/2022: I have reviewed the study independently and with the patient.  Unremarkable.  - MRA Head w/o Edd:   - MRV Head w/o Edd:   - NCHCT:  - Lumbar Puncture:    Review of Systems:     Review of Systems   All other systems reviewed and are negative.      Physical Exams:     Vitals:    08/18/23 1015   BP: 123/78   Pulse: 72   Temp: 97.9 °F (36.6 °C)       Physical Exam  Vitals and nursing note reviewed.   Constitutional:       Appearance: Normal appearance.   HENT:      Head: Normocephalic and atraumatic.      Nose: Nose normal.      Mouth/Throat:      Mouth: Mucous membranes are moist.      Pharynx: Oropharynx is clear.   Eyes:      Conjunctiva/sclera: Conjunctivae normal.   Cardiovascular:      Rate and Rhythm: Normal rate and regular rhythm.      Pulses: Normal pulses.   Pulmonary:      Effort: Pulmonary effort is normal.      Breath sounds: Normal breath sounds.   Abdominal:      General: Abdomen is flat.   Musculoskeletal:         General: Normal range of motion.      Cervical back: Normal range of motion.   Skin:     General: Skin is warm.   Neurological:      Mental Status: She is alert.         Comprehensive Neurological Exam:  Mental Status: Alert Oriented to Self, Date, and Place. Comprehension wnl. No dysarthria.   CN II - XII: HERI, No APD, Fundus wnl OU, VFFC, No ptosis OU, EOMI without nystagmus LT/Temp symmetric in CN V1-3 distribution, Hearing grossly intact, Face Symmetric, Tongue and Uvula midline, Trapezius symmetric bilateral.   Motor: tone and bulk wnl throughout, no abnormal involuntary or voluntary movements, 5/5 to confrontation, Fine finger  movements wnl b/l, No pronator drift.   Sensory: LT, Proprioception, Vibration, PP, Temp symmetric. No sensory simultagnosia.   Reflexes: 2+ throughout  Cerebellar: FNF wnl b/l, RAHM wnl b/l  Romberg: Negative  Gait: normal. Heel Gait, Toe Gait, Tandem Gait wnl.     Assessment:     This is 28 y.o. female with history of anxiety, type 2 diabetes, allergic rhinitis who is referred for episodic migraine without aura.     Problem List Items Addressed This Visit          Neuro    Migraine without aura and with status migrainosus, not intractable - Primary    Relevant Medications    propranoloL (INDERAL) 80 MG tablet    topiramate (TOPAMAX) 50 MG tablet    rizatriptan (MAXALT) 10 MG tablet       Plan:     [] continue with Duloxetine 90 mg daily  [] restart Propanolol 80 mg twice a day  [] start Topiramate 50 mg at bedtime   [] start Rizatripan 10 mg twice a day as needed  [] headache journal    RTC 3 Months     Headache education provided: good sleep hygiene and 7 hours of sleep per night, stress management, medication overuse education provided. Using more 3 OTC per week may worsen headaches, high intensity interval training has shown to reduce headache frequency. Low carb, high protein has shown to reduce headache frequency. Patient is instructed in keep headache diary.     I have explained the treatment plan, diagnosis, and prognosis to patient. All questions are answered to the best of my knowledge.     Face to face time 45 minutes, including documentation, chart review, counseling, education, review of test results, relevant medical records, and coordination of care.       Adilene Lancaster MD   General Neurology  08/18/2023

## 2023-08-18 ENCOUNTER — OFFICE VISIT (OUTPATIENT)
Dept: NEUROLOGY | Facility: CLINIC | Age: 28
End: 2023-08-18
Payer: MEDICAID

## 2023-08-18 VITALS
TEMPERATURE: 98 F | HEART RATE: 72 BPM | WEIGHT: 244.38 LBS | HEIGHT: 64 IN | SYSTOLIC BLOOD PRESSURE: 123 MMHG | OXYGEN SATURATION: 98 % | DIASTOLIC BLOOD PRESSURE: 78 MMHG | BODY MASS INDEX: 41.72 KG/M2

## 2023-08-18 DIAGNOSIS — G43.709 CHRONIC MIGRAINE WITHOUT AURA WITHOUT STATUS MIGRAINOSUS, NOT INTRACTABLE: ICD-10-CM

## 2023-08-18 DIAGNOSIS — G43.001 MIGRAINE WITHOUT AURA AND WITH STATUS MIGRAINOSUS, NOT INTRACTABLE: Primary | ICD-10-CM

## 2023-08-18 PROBLEM — J34.1 MAXILLARY SINUS CYST: Status: RESOLVED | Noted: 2022-10-26 | Resolved: 2023-08-18

## 2023-08-18 PROCEDURE — 99204 OFFICE O/P NEW MOD 45 MIN: CPT | Mod: S$PBB,,, | Performed by: PSYCHIATRY & NEUROLOGY

## 2023-08-18 PROCEDURE — 3051F HG A1C>EQUAL 7.0%<8.0%: CPT | Mod: CPTII,,, | Performed by: PSYCHIATRY & NEUROLOGY

## 2023-08-18 PROCEDURE — 3051F PR MOST RECENT HEMOGLOBIN A1C LEVEL 7.0 - < 8.0%: ICD-10-PCS | Mod: CPTII,,, | Performed by: PSYCHIATRY & NEUROLOGY

## 2023-08-18 PROCEDURE — 99204 PR OFFICE/OUTPT VISIT, NEW, LEVL IV, 45-59 MIN: ICD-10-PCS | Mod: S$PBB,,, | Performed by: PSYCHIATRY & NEUROLOGY

## 2023-08-18 PROCEDURE — 3008F BODY MASS INDEX DOCD: CPT | Mod: CPTII,,, | Performed by: PSYCHIATRY & NEUROLOGY

## 2023-08-18 PROCEDURE — 3078F PR MOST RECENT DIASTOLIC BLOOD PRESSURE < 80 MM HG: ICD-10-PCS | Mod: CPTII,,, | Performed by: PSYCHIATRY & NEUROLOGY

## 2023-08-18 PROCEDURE — 3074F SYST BP LT 130 MM HG: CPT | Mod: CPTII,,, | Performed by: PSYCHIATRY & NEUROLOGY

## 2023-08-18 PROCEDURE — 1159F PR MEDICATION LIST DOCUMENTED IN MEDICAL RECORD: ICD-10-PCS | Mod: CPTII,,, | Performed by: PSYCHIATRY & NEUROLOGY

## 2023-08-18 PROCEDURE — 99214 OFFICE O/P EST MOD 30 MIN: CPT | Mod: PBBFAC | Performed by: PSYCHIATRY & NEUROLOGY

## 2023-08-18 PROCEDURE — 3008F PR BODY MASS INDEX (BMI) DOCUMENTED: ICD-10-PCS | Mod: CPTII,,, | Performed by: PSYCHIATRY & NEUROLOGY

## 2023-08-18 PROCEDURE — 3078F DIAST BP <80 MM HG: CPT | Mod: CPTII,,, | Performed by: PSYCHIATRY & NEUROLOGY

## 2023-08-18 PROCEDURE — 1159F MED LIST DOCD IN RCRD: CPT | Mod: CPTII,,, | Performed by: PSYCHIATRY & NEUROLOGY

## 2023-08-18 PROCEDURE — 3074F PR MOST RECENT SYSTOLIC BLOOD PRESSURE < 130 MM HG: ICD-10-PCS | Mod: CPTII,,, | Performed by: PSYCHIATRY & NEUROLOGY

## 2023-08-18 RX ORDER — PROPRANOLOL HYDROCHLORIDE 80 MG/1
80 TABLET ORAL 2 TIMES DAILY
Qty: 60 TABLET | Refills: 3 | Status: SHIPPED | OUTPATIENT
Start: 2023-08-18 | End: 2023-12-05 | Stop reason: SDUPTHER

## 2023-08-18 RX ORDER — RIZATRIPTAN BENZOATE 10 MG/1
10 TABLET ORAL 2 TIMES DAILY PRN
Qty: 9 TABLET | Refills: 4 | Status: SHIPPED | OUTPATIENT
Start: 2023-08-18 | End: 2023-12-05 | Stop reason: SDUPTHER

## 2023-08-18 RX ORDER — TOPIRAMATE 50 MG/1
50 TABLET, FILM COATED ORAL NIGHTLY
Qty: 30 TABLET | Refills: 3 | Status: SHIPPED | OUTPATIENT
Start: 2023-08-18 | End: 2023-08-21 | Stop reason: SDUPTHER

## 2023-08-21 ENCOUNTER — PATIENT MESSAGE (OUTPATIENT)
Dept: NEUROLOGY | Facility: CLINIC | Age: 28
End: 2023-08-21
Payer: MEDICAID

## 2023-08-21 DIAGNOSIS — G43.001 MIGRAINE WITHOUT AURA AND WITH STATUS MIGRAINOSUS, NOT INTRACTABLE: ICD-10-CM

## 2023-08-21 RX ORDER — TOPIRAMATE 25 MG/1
25 TABLET ORAL NIGHTLY
Qty: 30 TABLET | Refills: 3 | Status: SHIPPED | OUTPATIENT
Start: 2023-08-21 | End: 2023-12-05 | Stop reason: SDUPTHER

## 2023-10-03 ENCOUNTER — PATIENT MESSAGE (OUTPATIENT)
Dept: INTERNAL MEDICINE | Facility: CLINIC | Age: 28
End: 2023-10-03
Payer: MEDICAID

## 2023-10-05 ENCOUNTER — OFFICE VISIT (OUTPATIENT)
Dept: BEHAVIORAL HEALTH | Facility: CLINIC | Age: 28
End: 2023-10-05
Payer: MEDICAID

## 2023-10-05 DIAGNOSIS — F41.1 GAD (GENERALIZED ANXIETY DISORDER): ICD-10-CM

## 2023-10-05 DIAGNOSIS — F33.1 MODERATE EPISODE OF RECURRENT MAJOR DEPRESSIVE DISORDER: ICD-10-CM

## 2023-10-05 PROCEDURE — 99213 OFFICE O/P EST LOW 20 MIN: CPT | Mod: NDTC,,, | Performed by: STUDENT IN AN ORGANIZED HEALTH CARE EDUCATION/TRAINING PROGRAM

## 2023-10-05 PROCEDURE — 1160F PR REVIEW ALL MEDS BY PRESCRIBER/CLIN PHARMACIST DOCUMENTED: ICD-10-PCS | Mod: CPTII,NDTC,, | Performed by: STUDENT IN AN ORGANIZED HEALTH CARE EDUCATION/TRAINING PROGRAM

## 2023-10-05 PROCEDURE — 1159F MED LIST DOCD IN RCRD: CPT | Mod: CPTII,NDTC,, | Performed by: STUDENT IN AN ORGANIZED HEALTH CARE EDUCATION/TRAINING PROGRAM

## 2023-10-05 PROCEDURE — 99213 PR OFFICE/OUTPT VISIT, EST, LEVL III, 20-29 MIN: ICD-10-PCS | Mod: NDTC,,, | Performed by: STUDENT IN AN ORGANIZED HEALTH CARE EDUCATION/TRAINING PROGRAM

## 2023-10-05 PROCEDURE — 1159F PR MEDICATION LIST DOCUMENTED IN MEDICAL RECORD: ICD-10-PCS | Mod: CPTII,NDTC,, | Performed by: STUDENT IN AN ORGANIZED HEALTH CARE EDUCATION/TRAINING PROGRAM

## 2023-10-05 PROCEDURE — 3051F HG A1C>EQUAL 7.0%<8.0%: CPT | Mod: CPTII,NDTC,, | Performed by: STUDENT IN AN ORGANIZED HEALTH CARE EDUCATION/TRAINING PROGRAM

## 2023-10-05 PROCEDURE — 1160F RVW MEDS BY RX/DR IN RCRD: CPT | Mod: CPTII,NDTC,, | Performed by: STUDENT IN AN ORGANIZED HEALTH CARE EDUCATION/TRAINING PROGRAM

## 2023-10-05 PROCEDURE — 3051F PR MOST RECENT HEMOGLOBIN A1C LEVEL 7.0 - < 8.0%: ICD-10-PCS | Mod: CPTII,NDTC,, | Performed by: STUDENT IN AN ORGANIZED HEALTH CARE EDUCATION/TRAINING PROGRAM

## 2023-10-05 RX ORDER — DULOXETIN HYDROCHLORIDE 30 MG/1
CAPSULE, DELAYED RELEASE ORAL
Qty: 30 CAPSULE | Refills: 11 | Status: SHIPPED | OUTPATIENT
Start: 2023-10-05

## 2023-10-05 RX ORDER — DULOXETIN HYDROCHLORIDE 60 MG/1
CAPSULE, DELAYED RELEASE ORAL
Qty: 30 CAPSULE | Refills: 11 | Status: SHIPPED | OUTPATIENT
Start: 2023-10-05

## 2023-10-05 NOTE — PROGRESS NOTES
Outpatient Psychiatry Follow-Up Visit    10/5/2023    Clinical Status of Patient:  Outpatient (Ambulatory)    Chief Complaint:  Cassandra Escobedo is a 28 y.o. female who presents today for follow-up of depression and anxiety. Patient last seen for initial evaluation on 11/30/2022. Met with patient.      Interval History and Content of Current Session:  Interim Events/Subjective Report/Content of Current Session:   Pt reports doing well since last visit.  Notes good control of mood and anxiety symptoms.  Sleeping well.  Appetite low, weight stable.  Energy occasionally low (attributes to metformin), motivation good.  Denies SI/HI/AVH/paranoia, denies plan or desire for self harm or harm to others.  Denies SE.  Denies somatic complaints other than fatigue.  Pt happy with current regimen and wants to continue.     Psychiatric Review of Systems-is patient experiencing or having changes in  Anxiety: well controlled  Sleep: good  Appetite: stable  Weight: stable  Energy: good  Concentration: good   Libido: no problem voiced  Irritability: denies  Motivation: good  Guilt/hopelessness: denies  Paranoia/delusions: denies  SIB/risky behavior: denies    Review of Systems   PSYCHIATRIC: Pertinant items are noted in the narrative.  CONSTITUTIONAL: No weight gain or loss.  MUSCULOSKELETAL: No pain or stiffness of the joints.  NEUROLOGIC: No weakness, sensory changes, seizures, confusion, memory loss, tremor or other abnormal movements.  CARDIAC: No CP, no palpitations  RESPIRATORY: No shortness of breath.  CARDIOVASCULAR: No tachycardia or chest pain.  GASTROINTESTINAL: No nausea, vomiting, pain, constipation or diarrhea.    Past Medical, Family and Social History: The patient's past medical, family and social history have been reviewed and updated as appropriate within the electronic medical record - see encounter notes.    Compliance: good    Side effects: fatigue    Risk Parameters:  Patient reports no suicidal  "ideation  Patient reports no homicidal ideation  Patient reports no self-injurious behavior  Patient reports no violent behavior    Exam (detailed: at least 9 elements; comprehensive: all 15 elements)   Constitutional  Vitals:  Most recent vital signs, dated less than 90 days prior to this appointment, were reviewed.     There were no vitals filed for this visit.       General:   Constitutional: No acute distress, appears stated age, casually dressed    Neurologic:   Motor: moves all extremities spontaneously and without difficulty, no abnormal involuntary movements observed  Gait: normal gait and station    Mental status examination:   Appearance: appears stated age, casually dressed, no acute distress  Behavior: unremarkable for situation, calm and cooperative  Mood: "good"  Affect: mood congruent and euthymic  Thought process: linear and goal directed  Associations: appropriate for conversation  Thought content: no plan or desire for self harm or harm to others, denies paranoia, no delusional ideation volunteered  Perceptions: denies hallucinations or other altered perceptions  Orientation: oriented to day of week, month, year, location, and situation  Language: English, fluid  Attention: able to attend to interview  Insight: good  Judgement: good    PHQ9:  Over the last two weeks how often have you been bothered by little interest or pleasure in doing things: 0  Over the last two weeks how often have you been bothered by feeling down, depressed or hopeless: 0  PHQ-2 Total Score: 0  PHQ-9 Score: 5  PHQ-9 Interpretation: Mild    GAD7:      3/30/2023    11:34 AM 11/30/2022     9:51 AM   GAD7   1. Feeling nervous, anxious, or on edge? 1 3   2. Not being able to stop or control worrying? 1 3   3. Worrying too much about different things? 1 3   4. Trouble relaxing? 0 3   5. Being so restless that it is hard to sit still? 0 3   6. Becoming easily annoyed or irritable? 0 3   7. Feeling afraid as if something awful might " happen? 0 2   8. If you checked off any problems, how difficult have these problems made it for you to do your work, take care of things at home, or get along with other people? 0 2   YOSEF-7 Score 3 20     Assessment and Diagnosis   Status/Progress: Based on the examination today, the patient's problem(s) is/are well controlled.  New problems have not been presented today.   Co-morbidities and Lack of compliance are not complicating management of the primary condition.  Number of separate conditions addressed during today's visit: 2 (mood well controlled, anxiety well controlled).  Are medication adjustments being made today: No.  Are referral(s) being ordered today: No.  Complexity (level) of medical decision making employed in the encounter: LOW.    General Impression:    ICD-10-CM ICD-9-CM   1. Moderate episode of recurrent major depressive disorder  F33.1 296.32   2. YOSEF (generalized anxiety disorder)  F41.1 300.02     Intervention/Counseling/Treatment Plan   Continue cymbalta 90mg daily  Recent labwork in EMR reviewed, CBC wnl, CMP w/ elevated ALT  TSH wnl, UDS negative at initial visit  No need for PEC as pt is not an imminent danger to self or others or gravely disabled due to acute psychiatric illness  Discussed that pt should either call clinic for psychiatric crisis symptoms or present to nearest emergency room    Discussed with patient informed consent including diagnosis, risks and benefits of proposed treatment above vs. alternative treatments vs. no treatment, as well as serious and common side effects of these treatments, and the inherent unpredictability of individual responses to these treatments. The patient expresses understanding of the above and displays the capacity to agree with this current plan. Patient also agrees that, currently, the benefits outweigh the risks and would like to pursue treatment at this time, and had no other questions.    Instructions:  Take all medications as prescribed.     Abstain from recreational drugs and alcohol.  Present to ED or call 911 for SI/HI plan or intent, psychosis, or medical emergency.    Return to Clinic: Follow up in about 6 months (around 4/5/2024).    Total time:     The total time for services performed on the date of the encounter: 15 minutes    Leo Chaney MD  [facility]

## 2023-11-30 NOTE — PROGRESS NOTES
[facility] Neurology Follow Up Office Visit Note    Initial Visit Date: 8/18/2023  Last Visit Date: 8/18/2023  Current Visit Date:  12/05/2023    Chief Complaint:     Chief Complaint   Patient presents with    Migraine     States migraines are better-Has  about 3 per month       History of Present Illness:      This is 28 y.o. female with history of anxiety, type 2 diabetes, allergic rhinitis who is referred for episodic migraine without aura. During last visit, propranolol 80 mg twice a day and Topiramate 50 mg daily was restarted. Rizatriptan 10 mg twice a day was started. Patient had called the office complaining of sedation with Topiramate 50 mg daily, thus it was decreased to 25 mg daily. Only taking Topiramate as needed.     Age of Onset : 9 years old      Headache Description:   right frontotemporal, pounding, severe, impeding day to day activity, lasting up to 7 days, with nausea and photophobia  Bifrontal, dull, moderate, not impeding day to day activity, constant with nausea      Frequency: 3 migraine headache days per month    Provocation Factors: not on propranolol      Risk Factors  - Family history of headache disorder: Yes maternal grandmother and mother with headache.  - History of focal CNS lesions: No  - History of CNS infections: No  - History head trauma: No  - History of underlying mood disorder: Yes anxiety disorder.  Follows with Dr. Chaney.  - History of sleep disorder: Yes not well  - Recreational drug use: Yes marijuana  - Tobacco use: Yes occasional  - Alcohol use: Yes occasional  - Weight fluctuation: Yes dropped 10 lbs for the past 1 month.  - Isotretinoin or Tetracycline use:  No  - Family planning and contraceptive use: Yes not sexually active. However, does plan on becoming pregnant when she becomes sexually active in the future.      Medications:     Current Prophylactic  Duloxetine 90 mg daily (11/30/2022 to present)  Propanolol 80 mg twice a day (8/18/2023 to present): effective  PT SLEEPY THIS EVENING, ATTEMPTED TO GIVE PO MEDS CRUSHED WITH APPLESAUCE, HE
WOKE ENOUGH TO TAKE THEM, BUT DID HAVE SOME WET SOUNDING COUGHING AFTER, ONLY
GAVE ASA AND METOPROLOL. ASKED  TO CHANGE FLAGYL BACK TO IV. PT RESTING AT
THIS TIME, CALL LIGHT IN REACH.   Topiramate 25 mg daily (8/21/2023 to present): taking it as needed. Effective. Sedating;.     Current Abortive  Ibuprofen 800 mg TID PRN: ineffective   Rizatriptan 10 mg twice a day as needed (8/18/2023 to present): effective.     Prior Prophylactic  Topiramate 50 mg daily (8/18/2023 - 8/21/2023): sedation     Prior Abortive  Denied     Devices:     - VNS:  - TNS  - TMS:     Procedures:     - Botox:  - PSG block:   - Occipital nerve block:     Labs:     Results for orders placed or performed in visit on 07/27/23    PAP SMEAR   Result Value Ref Range    PAP Recommendation External No follow-up frequency specified        Studies:     - MRI Brainwithout contrast 10/17/2022: I have reviewed the study independently and with the patient.  Unremarkable.   - MRA Head w/o Edd:   - MRV Head w/o Edd:   - NCHCT:  - Lumbar Puncture:    Review of Systems:     Review of Systems   All other systems reviewed and are negative.      Physical Exams:     Vitals:    12/05/23 0958   BP: 120/83   Pulse: 70   Resp: 12   Temp: 97.5 °F (36.4 °C)       Physical Exam  Vitals and nursing note reviewed.   Constitutional:       Appearance: Normal appearance.   HENT:      Head: Normocephalic and atraumatic.      Nose: Nose normal.      Mouth/Throat:      Mouth: Mucous membranes are moist.      Pharynx: Oropharynx is clear.   Eyes:      Conjunctiva/sclera: Conjunctivae normal.   Cardiovascular:      Rate and Rhythm: Normal rate and regular rhythm.      Pulses: Normal pulses.   Pulmonary:      Effort: Pulmonary effort is normal.      Breath sounds: Normal breath sounds.   Abdominal:      General: Abdomen is flat.   Musculoskeletal:         General: Normal range of motion.      Cervical back: Normal range of motion.   Skin:     General: Skin is warm.   Neurological:      Mental Status: She is alert.         Comprehensive Neurological Exam:  Mental Status: Alert Oriented to Self, Date, and Place. Comprehension wnl. No dysarthria.   CN II - XII:  HERI, No APD, Fundus wnl OU, VFFC, No ptosis OU, EOMI without nystagmus LT/Temp symmetric in CN V1-3 distribution, Hearing grossly intact, Face Symmetric, Tongue and Uvula midline, Trapezius symmetric bilateral.   Motor: tone and bulk wnl throughout, no abnormal involuntary or voluntary movements, 5/5 to confrontation, Fine finger movements wnl b/l, No pronator drift.   Sensory: LT, Proprioception, Vibration, PP, Temp symmetric. No sensory simultagnosia.   Reflexes: 2+ throughout  Cerebellar: FNF wnl b/l, RAHM wnl b/l  Romberg: Negative  Gait: normal. Heel Gait, Toe Gait, Tandem Gait wnl.     Assessment:     This is 28 y.o. female with history of anxiety, type 2 diabetes, allergic rhinitis who is referred for episodic migraine without aura.      Problem List Items Addressed This Visit          Neuro    Migraine without aura and with status migrainosus, not intractable - Primary       Plan:     [] continue with Duloxetine 90 mg daily  [] continue with Propanolol 80 mg twice a day  [] continue with Topiramate 25 mg at bedtime as needed  [] continue with Rizatripan 10 mg twice a day as needed  [] headache journal       RTC 6 months with NP    Headache education provided: good sleep hygiene and 7 hours of sleep per night, stress management, medication overuse education provided. Using more 3 OTC per week may worsen headaches, high intensity interval training has shown to reduce headache frequency. Low carb, high protein has shown to reduce headache frequency. Patient is instructed in keep headache diary.     I have explained the treatment plan, diagnosis, and prognosis to patient. All questions are answered to the best of my knowledge.     Face to face time 30 minutes, including documentation, chart review, counseling, education, review of test results, relevant medical records, and coordination of care.       Adilene Lancaster MD   General Neurology  12/05/2023

## 2023-12-05 ENCOUNTER — OFFICE VISIT (OUTPATIENT)
Dept: NEUROLOGY | Facility: CLINIC | Age: 28
End: 2023-12-05
Payer: MEDICAID

## 2023-12-05 VITALS
HEART RATE: 70 BPM | DIASTOLIC BLOOD PRESSURE: 83 MMHG | OXYGEN SATURATION: 98 % | WEIGHT: 240.19 LBS | TEMPERATURE: 98 F | BODY MASS INDEX: 41.01 KG/M2 | RESPIRATION RATE: 12 BRPM | HEIGHT: 64 IN | SYSTOLIC BLOOD PRESSURE: 120 MMHG

## 2023-12-05 DIAGNOSIS — G43.001 MIGRAINE WITHOUT AURA AND WITH STATUS MIGRAINOSUS, NOT INTRACTABLE: Primary | ICD-10-CM

## 2023-12-05 PROCEDURE — 1159F MED LIST DOCD IN RCRD: CPT | Mod: CPTII,,, | Performed by: PSYCHIATRY & NEUROLOGY

## 2023-12-05 PROCEDURE — 3066F NEPHROPATHY DOC TX: CPT | Mod: CPTII,,, | Performed by: PSYCHIATRY & NEUROLOGY

## 2023-12-05 PROCEDURE — 3074F PR MOST RECENT SYSTOLIC BLOOD PRESSURE < 130 MM HG: ICD-10-PCS | Mod: CPTII,,, | Performed by: PSYCHIATRY & NEUROLOGY

## 2023-12-05 PROCEDURE — 3008F PR BODY MASS INDEX (BMI) DOCUMENTED: ICD-10-PCS | Mod: CPTII,,, | Performed by: PSYCHIATRY & NEUROLOGY

## 2023-12-05 PROCEDURE — 3060F POS MICROALBUMINURIA REV: CPT | Mod: CPTII,,, | Performed by: PSYCHIATRY & NEUROLOGY

## 2023-12-05 PROCEDURE — 3074F SYST BP LT 130 MM HG: CPT | Mod: CPTII,,, | Performed by: PSYCHIATRY & NEUROLOGY

## 2023-12-05 PROCEDURE — 1159F PR MEDICATION LIST DOCUMENTED IN MEDICAL RECORD: ICD-10-PCS | Mod: CPTII,,, | Performed by: PSYCHIATRY & NEUROLOGY

## 2023-12-05 PROCEDURE — 99214 OFFICE O/P EST MOD 30 MIN: CPT | Mod: S$PBB,,, | Performed by: PSYCHIATRY & NEUROLOGY

## 2023-12-05 PROCEDURE — 3060F PR POS MICROALBUMINURIA RESULT DOCUMENTED/REVIEW: ICD-10-PCS | Mod: CPTII,,, | Performed by: PSYCHIATRY & NEUROLOGY

## 2023-12-05 PROCEDURE — 99214 OFFICE O/P EST MOD 30 MIN: CPT | Mod: PBBFAC | Performed by: PSYCHIATRY & NEUROLOGY

## 2023-12-05 PROCEDURE — 3008F BODY MASS INDEX DOCD: CPT | Mod: CPTII,,, | Performed by: PSYCHIATRY & NEUROLOGY

## 2023-12-05 PROCEDURE — 99214 PR OFFICE/OUTPT VISIT, EST, LEVL IV, 30-39 MIN: ICD-10-PCS | Mod: S$PBB,,, | Performed by: PSYCHIATRY & NEUROLOGY

## 2023-12-05 PROCEDURE — 3079F PR MOST RECENT DIASTOLIC BLOOD PRESSURE 80-89 MM HG: ICD-10-PCS | Mod: CPTII,,, | Performed by: PSYCHIATRY & NEUROLOGY

## 2023-12-05 PROCEDURE — 3051F PR MOST RECENT HEMOGLOBIN A1C LEVEL 7.0 - < 8.0%: ICD-10-PCS | Mod: CPTII,,, | Performed by: PSYCHIATRY & NEUROLOGY

## 2023-12-05 PROCEDURE — 3066F PR DOCUMENTATION OF TREATMENT FOR NEPHROPATHY: ICD-10-PCS | Mod: CPTII,,, | Performed by: PSYCHIATRY & NEUROLOGY

## 2023-12-05 PROCEDURE — 3051F HG A1C>EQUAL 7.0%<8.0%: CPT | Mod: CPTII,,, | Performed by: PSYCHIATRY & NEUROLOGY

## 2023-12-05 PROCEDURE — 3079F DIAST BP 80-89 MM HG: CPT | Mod: CPTII,,, | Performed by: PSYCHIATRY & NEUROLOGY

## 2023-12-05 RX ORDER — RIZATRIPTAN BENZOATE 10 MG/1
10 TABLET ORAL 2 TIMES DAILY PRN
Qty: 9 TABLET | Refills: 6 | Status: SHIPPED | OUTPATIENT
Start: 2023-12-05 | End: 2024-01-04

## 2023-12-05 RX ORDER — TOPIRAMATE 25 MG/1
25 TABLET ORAL NIGHTLY
Qty: 30 TABLET | Refills: 3 | Status: SHIPPED | OUTPATIENT
Start: 2023-12-05 | End: 2024-04-03

## 2023-12-05 RX ORDER — PROPRANOLOL HYDROCHLORIDE 80 MG/1
80 TABLET ORAL 2 TIMES DAILY
Qty: 60 TABLET | Refills: 6 | Status: SHIPPED | OUTPATIENT
Start: 2023-12-05 | End: 2024-04-03

## 2023-12-21 ENCOUNTER — OFFICE VISIT (OUTPATIENT)
Dept: INTERNAL MEDICINE | Facility: CLINIC | Age: 28
End: 2023-12-21
Payer: MEDICAID

## 2023-12-21 ENCOUNTER — CLINICAL SUPPORT (OUTPATIENT)
Dept: INTERNAL MEDICINE | Facility: CLINIC | Age: 28
End: 2023-12-21
Attending: NURSE PRACTITIONER
Payer: MEDICAID

## 2023-12-21 VITALS
BODY MASS INDEX: 41.15 KG/M2 | HEIGHT: 64 IN | RESPIRATION RATE: 16 BRPM | SYSTOLIC BLOOD PRESSURE: 122 MMHG | DIASTOLIC BLOOD PRESSURE: 85 MMHG | TEMPERATURE: 99 F | WEIGHT: 241 LBS

## 2023-12-21 DIAGNOSIS — E11.9 TYPE 2 DIABETES MELLITUS WITHOUT COMPLICATION, WITHOUT LONG-TERM CURRENT USE OF INSULIN: ICD-10-CM

## 2023-12-21 DIAGNOSIS — Z00.00 WELLNESS EXAMINATION: Primary | ICD-10-CM

## 2023-12-21 DIAGNOSIS — Z11.4 SCREENING FOR HIV (HUMAN IMMUNODEFICIENCY VIRUS): ICD-10-CM

## 2023-12-21 PROBLEM — R73.03 PREDIABETES: Chronic | Status: RESOLVED | Noted: 2022-10-03 | Resolved: 2023-12-21

## 2023-12-21 LAB
LEFT EYE DM RETINOPATHY: NEGATIVE
RIGHT EYE DM RETINOPATHY: NEGATIVE

## 2023-12-21 PROCEDURE — 3079F PR MOST RECENT DIASTOLIC BLOOD PRESSURE 80-89 MM HG: ICD-10-PCS | Mod: CPTII,,, | Performed by: NURSE PRACTITIONER

## 2023-12-21 PROCEDURE — 3008F PR BODY MASS INDEX (BMI) DOCUMENTED: ICD-10-PCS | Mod: CPTII,,, | Performed by: NURSE PRACTITIONER

## 2023-12-21 PROCEDURE — 99395 PR PREVENTIVE VISIT,EST,18-39: ICD-10-PCS | Mod: S$PBB,,, | Performed by: NURSE PRACTITIONER

## 2023-12-21 PROCEDURE — 3060F PR POS MICROALBUMINURIA RESULT DOCUMENTED/REVIEW: ICD-10-PCS | Mod: CPTII,,, | Performed by: NURSE PRACTITIONER

## 2023-12-21 PROCEDURE — 92228 IMG RTA DETC/MNTR DS PHY/QHP: CPT | Mod: PBBFAC

## 2023-12-21 PROCEDURE — 1159F MED LIST DOCD IN RCRD: CPT | Mod: CPTII,,, | Performed by: NURSE PRACTITIONER

## 2023-12-21 PROCEDURE — 1160F PR REVIEW ALL MEDS BY PRESCRIBER/CLIN PHARMACIST DOCUMENTED: ICD-10-PCS | Mod: CPTII,,, | Performed by: NURSE PRACTITIONER

## 2023-12-21 PROCEDURE — 3079F DIAST BP 80-89 MM HG: CPT | Mod: CPTII,,, | Performed by: NURSE PRACTITIONER

## 2023-12-21 PROCEDURE — 1160F RVW MEDS BY RX/DR IN RCRD: CPT | Mod: CPTII,,, | Performed by: NURSE PRACTITIONER

## 2023-12-21 PROCEDURE — 99213 OFFICE O/P EST LOW 20 MIN: CPT | Mod: PBBFAC | Performed by: NURSE PRACTITIONER

## 2023-12-21 PROCEDURE — 3060F POS MICROALBUMINURIA REV: CPT | Mod: CPTII,,, | Performed by: NURSE PRACTITIONER

## 2023-12-21 PROCEDURE — 3008F BODY MASS INDEX DOCD: CPT | Mod: CPTII,,, | Performed by: NURSE PRACTITIONER

## 2023-12-21 PROCEDURE — 3074F PR MOST RECENT SYSTOLIC BLOOD PRESSURE < 130 MM HG: ICD-10-PCS | Mod: CPTII,,, | Performed by: NURSE PRACTITIONER

## 2023-12-21 PROCEDURE — 3066F NEPHROPATHY DOC TX: CPT | Mod: CPTII,,, | Performed by: NURSE PRACTITIONER

## 2023-12-21 PROCEDURE — 3074F SYST BP LT 130 MM HG: CPT | Mod: CPTII,,, | Performed by: NURSE PRACTITIONER

## 2023-12-21 PROCEDURE — 99395 PREV VISIT EST AGE 18-39: CPT | Mod: S$PBB,,, | Performed by: NURSE PRACTITIONER

## 2023-12-21 PROCEDURE — 3051F HG A1C>EQUAL 7.0%<8.0%: CPT | Mod: CPTII,,, | Performed by: NURSE PRACTITIONER

## 2023-12-21 PROCEDURE — 3051F PR MOST RECENT HEMOGLOBIN A1C LEVEL 7.0 - < 8.0%: ICD-10-PCS | Mod: CPTII,,, | Performed by: NURSE PRACTITIONER

## 2023-12-21 PROCEDURE — 1159F PR MEDICATION LIST DOCUMENTED IN MEDICAL RECORD: ICD-10-PCS | Mod: CPTII,,, | Performed by: NURSE PRACTITIONER

## 2023-12-21 PROCEDURE — 3066F PR DOCUMENTATION OF TREATMENT FOR NEPHROPATHY: ICD-10-PCS | Mod: CPTII,,, | Performed by: NURSE PRACTITIONER

## 2023-12-21 RX ORDER — METFORMIN HYDROCHLORIDE 500 MG/1
500 TABLET ORAL 2 TIMES DAILY WITH MEALS
Qty: 60 TABLET | Refills: 0 | Status: SHIPPED | OUTPATIENT
Start: 2023-12-21 | End: 2024-01-16 | Stop reason: SDUPTHER

## 2023-12-21 NOTE — ASSESSMENT & PLAN NOTE
A1C Today with labs  Continue RX metformin 500 mg BID  2 week f/u via virtual    Follow ADA diet.  Avoid soda, simple sweets, and limit rice/pasta/bread/starches and consume brown options when possible.   Maintain healthy weight with BMI goal <30.   Perform aerobic exercise for 150 minutes per week (or 5 days a week for 30 minutes each day).   Examine feet daily.

## 2023-12-21 NOTE — PROGRESS NOTES
GRACY Ambrosio   [facility]  [address] [facility] [address]      PATIENT NAME: Cassandra Escobedo  : 1995  DATE: 23  MRN: [id number]      Patient PCP Information       Provider PCP Type    GRACY Ambrosio General            Reason for Visit / Chief Complaint: Health Maintenance       History of Present Illness / Problem Focused Workflow     Cassandra Escobedo presents to the clinic with Health Maintenance     29 yo WF here today for f/u. Medical problems include T2DM, Migraines & anxiety. Followed by [facility] and Dr. Chaney.     Cervical Cancer Screening: [place] - F/U   Osteoporosis Screenin23 Vitamin D Level   HCV Screening: 10/03/22    2023  Pt here today for yearly wellness OV, will complete labs after today's visit. Pt reports compliance with all mediations, metformin refilled x 1 month for pt today. Aware of routine scheduled speciatly appointments. Will f/u in1 month via virtual for lab review and med refills. Denies any acute issues today. Agreeable to Fundal exam today.   Denies chest pain, shortness of breath, cough, fever, headache, dizziness, weakness, abdominal pain, nausea, vomiting, diarrhea, constipation, black/bloody stools, unplanned weight loss, night sweats, changes in urinary patterns, burning/odor with urination, depression, anxiety, and SI/HI.               Review of Systems     Review of Systems   Constitutional: Negative.    HENT: Negative.     Eyes: Negative.    Respiratory: Negative.     Cardiovascular: Negative.    Gastrointestinal: Negative.    Endocrine: Negative.    Genitourinary: Negative.    Neurological: Negative.    Psychiatric/Behavioral: Negative.           Medications and Allergies     Medications  Current Outpatient Medications   Medication Instructions    cetirizine (ZYRTEC) 10 MG tablet   See Instructions, TAKE ONE TABLET BY MOUTH EVERY DAY, # 30 tab(s), 1 Refill(s),  "Pharmacy: [facility], 163, cm, Height/Length Dosing, 07/05/22 11:37:00 CDT, 113.1, kg, Weight Dosing, 07/05/22 11:37:00 CDT    DULoxetine (CYMBALTA) 30 MG capsule Take 30mg+ 60mg by mouth daily.    DULoxetine (CYMBALTA) 60 MG capsule Take 30mg+ 60mg by mouth daily.    metFORMIN (GLUCOPHAGE) 500 mg, Oral, 2 times daily with meals, For Diabetes    propranoloL (INDERAL) 80 mg, Oral, 2 times daily    rizatriptan (MAXALT) 10 mg, Oral, 2 times daily PRN    topiramate (TOPAMAX) 25 mg, Oral, Nightly         Allergies  Review of patient's allergies indicates:   Allergen Reactions    Phenergan plain     Promethazine     Sulfa (sulfonamide antibiotics)        Physical Examination     Visit Vitals  /85   Temp 98.7 °F (37.1 °C)   Resp 16   Ht 5' 4" (1.626 m)   Wt 109.3 kg (241 lb)   BMI 41.37 kg/m²       Physical Exam  Vitals reviewed.   Constitutional:       Appearance: Normal appearance. She is normal weight.   HENT:      Head: Normocephalic.   Cardiovascular:      Rate and Rhythm: Normal rate and regular rhythm.      Pulses: Normal pulses.      Heart sounds: Normal heart sounds.   Pulmonary:      Effort: Pulmonary effort is normal.      Breath sounds: Normal breath sounds.   Abdominal:      General: Abdomen is flat.      Palpations: Abdomen is soft.   Musculoskeletal:         General: Normal range of motion.      Cervical back: Normal range of motion.   Skin:     General: Skin is warm and dry.   Neurological:      Mental Status: She is alert.   Psychiatric:         Mood and Affect: Mood normal.           Results           Assessment        ICD-10-CM ICD-9-CM   1. Wellness examination  Z00.00 V70.0   2. Type 2 diabetes mellitus without complication, without long-term current use of insulin  E11.9 250.00   3. Screening for HIV (human immunodeficiency virus)  Z11.4 V73.89        Plan      Problem List Items Addressed This Visit          Endocrine    Type 2 diabetes mellitus without complication, without long-term current " use of insulin    Overview                Current Assessment & Plan     A1C Today with labs  Continue RX metformin 500 mg BID  2 week f/u via virtual    Follow ADA diet.  Avoid soda, simple sweets, and limit rice/pasta/bread/starches and consume brown options when possible.   Maintain healthy weight with BMI goal <30.   Perform aerobic exercise for 150 minutes per week (or 5 days a week for 30 minutes each day).   Examine feet daily.            Relevant Medications    metFORMIN (GLUCOPHAGE) 500 MG tablet    Other Relevant Orders    Diabetic Eye Screening Photo    Hemoglobin A1C    MICROALBUMIN / CREATININE RATIO URINE       Other    Wellness examination - Primary    Relevant Orders    T4, Free    TSH    Vitamin D    Comprehensive Metabolic Panel    Urinalysis, Reflex to Urine Culture    Lipid Panel    CBC Auto Differential     Other Visit Diagnoses       Screening for HIV (human immunodeficiency virus)        Relevant Orders    HIV 1/2 Ag/Ab (4th Gen)            Future Appointments   Date Time Provider Department Center   1/2/2024 12:30 PM RESIDENT 3, [facility] OTORHINOLARYNGOLOGY [facility] ENT [place] [facility]   4/9/2024 11:00 AM Leo Chaney MD [facility]   6/10/2024  9:30 AM Maricel Weems ANP [facility] NEURO [facility]        Follow up in about 15 days (around 1/5/2024) for Established Virtual - Wellness Lab Review.      Signature:     [facility]  [address] [facility] [address]    Date of encounter: 12/21/23

## 2023-12-22 NOTE — PROGRESS NOTES
Cassandra Escobedo is a 28 y.o. female here for a diabetic eye screening with non-dilated fundus photos per GRACY Davis.    Patient cooperative?: Yes  Small pupils?: No  Last eye exam: unknown    For exam results, see Encounter Report.

## 2024-01-16 NOTE — PROGRESS NOTES
GRACY Ambrosio   [facility]  [address] [facility] [address]      PATIENT NAME: Cassandra Escobedo  : 1995  DATE: 24  MRN: [id number]      Reason for Visit / Chief Complaint: Health Maintenance (Lab review)       History of Present Illness / Problem Focused Workflow     Cassandra Escobedo presents to the clinic with Health Maintenance (Lab review)     27 yo WF here today for f/u. Medical problems include T2DM, Migraines & anxiety. Followed by [facility] and Dr. Chaney.     Cervical Cancer Screening: [place] - F/U   Osteoporosis Screenin23 Vitamin D Level   Diabetic Screening: [place] [facility]   HCV Screening: 10/03/22    2023  Pt here today for yearly wellness OV, will complete labs after today's visit. Pt reports compliance with all mediations, metformin refilled x 1 month for pt today. Aware of routine scheduled speciatly appointments. Will f/u in1 month via virtual for lab review and med refills. Denies any acute issues today. Agreeable to Fundal exam today.   Denies chest pain, shortness of breath, cough, fever, headache, dizziness, weakness, abdominal pain, nausea, vomiting, diarrhea, constipation, black/bloody stools, unplanned weight loss, night sweats, changes in urinary patterns, burning/odor with urination, depression, anxiety, and SI/HI.     2024  Pt here today for f/u via virtual for lab review. Labs reviewed and discussed with no questions or concerns at this time. A1C at goal of 6.2. Will continue current medication regiment at this time. Meds reviewed and refilled appropriately. We will f/u in 6 months with routine labs via virtual.   Denies chest pain, shortness of breath, cough, fever, headache, dizziness, weakness, abdominal pain, nausea, vomiting, diarrhea, constipation, black/bloody stools, unplanned weight loss, night sweats, changes in urinary patterns, burning/odor with urination,  depression, anxiety, and SI/HI.             Review of Systems     Review of Systems   Constitutional: Negative.    HENT: Negative.     Eyes: Negative.    Respiratory: Negative.     Cardiovascular: Negative.    Gastrointestinal: Negative.    Endocrine: Negative.    Genitourinary: Negative.    Neurological: Negative.    Psychiatric/Behavioral: Negative.           Medications and Allergies     Medications  Medication List with Changes/Refills   Current Medications    CETIRIZINE (ZYRTEC) 10 MG TABLET      See Instructions, TAKE ONE TABLET BY MOUTH EVERY DAY, # 30 tab(s), 1 Refill(s), [facility], cm, Height/Length Dosing, 07/05/22 11:37:00 CDT, 113.1, kg, Weight Dosing, 07/05/22 11:37:00 CDT    DULOXETINE (CYMBALTA) 30 MG CAPSULE    Take 30mg+ 60mg by mouth daily.    DULOXETINE (CYMBALTA) 60 MG CAPSULE    Take 30mg+ 60mg by mouth daily.    PROPRANOLOL (INDERAL) 80 MG TABLET    Take 1 tablet (80 mg total) by mouth 2 (two) times daily.    RIZATRIPTAN (MAXALT) 10 MG TABLET    Take 1 tablet (10 mg total) by mouth 2 (two) times daily as needed for Migraine.    TOPIRAMATE (TOPAMAX) 25 MG TABLET    Take 1 tablet (25 mg total) by mouth every evening.   Changed and/or Refilled Medications    Modified Medication Previous Medication    METFORMIN (GLUCOPHAGE) 500 MG TABLET metFORMIN (GLUCOPHAGE) 500 MG tablet       Take 1 tablet (500 mg total) by mouth 2 (two) times daily with meals. For Diabetes    Take 1 tablet (500 mg total) by mouth 2 (two) times daily with meals. For Diabetes         Allergies  Review of patient's allergies indicates:   Allergen Reactions    Phenergan plain     Promethazine     Sulfa (sulfonamide antibiotics)        Physical Examination   There were no vitals filed for this visit.  Physical Exam  HENT:      Right Ear: Hearing normal.      Left Ear: Hearing normal.   Neurological:      Mental Status: She is alert and oriented to person, place, and time.   Psychiatric:         Mood and Affect: Mood  normal.           Results     Lab Results   Component Value Date    WBC 6.43 12/21/2023    RBC 5.24 12/21/2023    HGB 13.8 12/21/2023    HCT 43.9 12/21/2023    MCV 83.8 12/21/2023    MCH 26.3 (L) 12/21/2023    MCHC 31.4 (L) 12/21/2023    RDW 13.2 12/21/2023     12/21/2023    MPV 11.3 (H) 12/21/2023     Sodium Level   Date Value Ref Range Status   12/21/2023 141 136 - 145 mmol/L Final     Potassium Level   Date Value Ref Range Status   12/21/2023 3.8 3.5 - 5.1 mmol/L Final     Carbon Dioxide   Date Value Ref Range Status   12/21/2023 27 22 - 29 mmol/L Final     Blood Urea Nitrogen   Date Value Ref Range Status   12/21/2023 9.6 7.0 - 18.7 mg/dL Final     Creatinine   Date Value Ref Range Status   12/21/2023 0.69 0.55 - 1.02 mg/dL Final     Calcium Level Total   Date Value Ref Range Status   12/21/2023 9.1 8.4 - 10.2 mg/dL Final     Albumin Level   Date Value Ref Range Status   12/21/2023 3.7 3.5 - 5.0 g/dL Final     Bilirubin Total   Date Value Ref Range Status   12/21/2023 0.3 <=1.5 mg/dL Final     Alkaline Phosphatase   Date Value Ref Range Status   12/21/2023 72 40 - 150 unit/L Final     Aspartate Aminotransferase   Date Value Ref Range Status   12/21/2023 16 5 - 34 unit/L Final     Alanine Aminotransferase   Date Value Ref Range Status   12/21/2023 50 0 - 55 unit/L Final     eGFR   Date Value Ref Range Status   09/15/2021 159 mL/min/1.73 m2 Final     Lab Results   Component Value Date    CHOL 209 (H) 12/21/2023     Lab Results   Component Value Date    HDL 42 12/21/2023     Lab Results   Component Value Date    TRIG 222 (H) 12/21/2023     Lab Results   Component Value Date    VLDL 44 12/21/2023     Lab Results   Component Value Date    .00 12/21/2023     Lab Results   Component Value Date    TSH 0.701 12/21/2023     Lab Results   Component Value Date    PROTEINUA 30 (A) 05/05/2023    LEUKOCYTESUR Negative 05/05/2023     Lab Results   Component Value Date    HGBA1C 6.2 12/21/2023    HGBA1C 7.1 (H)  05/05/2023    HGBA1C 6.4 10/03/2022           Assessment         ICD-10-CM ICD-9-CM   1. Type 2 diabetes mellitus without complication, without long-term current use of insulin  E11.9 250.00   2. Vitamin D deficiency  E55.9 268.9       Plan      Problem List Items Addressed This Visit          Endocrine    Vitamin D deficiency    Current Assessment & Plan     Lab Results   Component Value Date    [id number] 24.8 (L) 12/21/2023   Educated on increasing foods high in Vitamin D such as fish oil, cod liver oil, salmon, milk fortified with vitamin D.  Vitamin D 2000 I.U. tablets daily (purchase over the counter).  Repeat Vitamin D level as ordered.           Type 2 diabetes mellitus without complication, without long-term current use of insulin - Primary    Overview                Current Assessment & Plan     A1C at Goal  Continue Metformin 500 mg BID  6 month f/u with labs via virtual   Follow ADA diet.  Avoid soda, simple sweets, and limit rice/pasta/bread/starches and consume brown options when possible.   Maintain healthy weight with BMI goal <30.   Perform aerobic exercise for 150 minutes per week (or 5 days a week for 30 minutes each day).   Examine feet daily.     Lab Results   Component Value Date    HGBA1C 6.2 12/21/2023            Relevant Medications    metFORMIN (GLUCOPHAGE) 500 MG tablet    Other Relevant Orders    Urinalysis, Reflex to Urine Culture    Microalbumin/Creatinine Ratio, Urine    Lipid Panel    Hemoglobin A1C    Comprehensive Metabolic Panel    CBC Auto Differential       Future Appointments   Date Time Provider Department Center   3/19/2024 12:30 PM RESIDENT 3, [facility] OTORHINOLARYNGOLOGY [facility] ENT [place] [facility]   4/9/2024 11:00 AM [facility]   6/10/2024  9:30 AM Maricel Weems, ANP [facility] NEURO [place] [facility]            Signature:     [facility]  [address] [facility] [address]    Date of encounter:  1/17/24    The patient location is: home  The chief complaint leading to consultation is: lab review    Visit type: audiovisual    Face to Face time with patient: 15  20 minutes of total time spent on the encounter, which includes face to face time and non-face to face time preparing to see the patient (eg, review of tests), Obtaining and/or reviewing separately obtained history, Documenting clinical information in the electronic or other health record, Independently interpreting results (not separately reported) and communicating results to the patient/family/caregiver, or Care coordination (not separately reported).         Each patient to whom he or she provides medical services by telemedicine is:  (1) informed of the relationship between the physician and patient and the respective role of any other health care provider with respect to management of the patient; and (2) notified that he or she may decline to receive medical services by telemedicine and may withdraw from such care at any time.    Notes:

## 2024-01-17 ENCOUNTER — OFFICE VISIT (OUTPATIENT)
Dept: INTERNAL MEDICINE | Facility: CLINIC | Age: 29
End: 2024-01-17
Payer: MEDICAID

## 2024-01-17 DIAGNOSIS — E11.9 TYPE 2 DIABETES MELLITUS WITHOUT COMPLICATION, WITHOUT LONG-TERM CURRENT USE OF INSULIN: Primary | ICD-10-CM

## 2024-01-17 DIAGNOSIS — E55.9 VITAMIN D DEFICIENCY: ICD-10-CM

## 2024-01-17 LAB
LEFT EYE DM RETINOPATHY: NEGATIVE
RIGHT EYE DM RETINOPATHY: NEGATIVE

## 2024-01-17 PROCEDURE — 1159F MED LIST DOCD IN RCRD: CPT | Mod: CPTII,95,, | Performed by: NURSE PRACTITIONER

## 2024-01-17 PROCEDURE — 99214 OFFICE O/P EST MOD 30 MIN: CPT | Mod: 95,,, | Performed by: NURSE PRACTITIONER

## 2024-01-17 PROCEDURE — 1160F RVW MEDS BY RX/DR IN RCRD: CPT | Mod: CPTII,95,, | Performed by: NURSE PRACTITIONER

## 2024-01-17 RX ORDER — METFORMIN HYDROCHLORIDE 500 MG/1
500 TABLET ORAL 2 TIMES DAILY WITH MEALS
Qty: 180 TABLET | Refills: 1 | Status: SHIPPED | OUTPATIENT
Start: 2024-01-17 | End: 2024-07-15

## 2024-01-19 ENCOUNTER — DOCUMENTATION ONLY (OUTPATIENT)
Dept: INTERNAL MEDICINE | Facility: CLINIC | Age: 29
End: 2024-01-19
Payer: MEDICAID

## 2024-03-25 PROBLEM — Z00.00 WELLNESS EXAMINATION: Status: RESOLVED | Noted: 2023-12-21 | Resolved: 2024-03-25

## 2024-05-29 ENCOUNTER — OFFICE VISIT (OUTPATIENT)
Dept: BEHAVIORAL HEALTH | Facility: CLINIC | Age: 29
End: 2024-05-29
Payer: MEDICAID

## 2024-05-29 DIAGNOSIS — F33.1 MODERATE EPISODE OF RECURRENT MAJOR DEPRESSIVE DISORDER: ICD-10-CM

## 2024-05-29 DIAGNOSIS — F41.1 GAD (GENERALIZED ANXIETY DISORDER): ICD-10-CM

## 2024-05-29 PROCEDURE — 1159F MED LIST DOCD IN RCRD: CPT | Mod: CPTII,95,, | Performed by: STUDENT IN AN ORGANIZED HEALTH CARE EDUCATION/TRAINING PROGRAM

## 2024-05-29 PROCEDURE — 99213 OFFICE O/P EST LOW 20 MIN: CPT | Mod: 95,,, | Performed by: STUDENT IN AN ORGANIZED HEALTH CARE EDUCATION/TRAINING PROGRAM

## 2024-05-29 PROCEDURE — 1160F RVW MEDS BY RX/DR IN RCRD: CPT | Mod: CPTII,95,, | Performed by: STUDENT IN AN ORGANIZED HEALTH CARE EDUCATION/TRAINING PROGRAM

## 2024-05-29 RX ORDER — DULOXETIN HYDROCHLORIDE 30 MG/1
CAPSULE, DELAYED RELEASE ORAL
Qty: 90 CAPSULE | Refills: 3 | Status: SHIPPED | OUTPATIENT
Start: 2024-05-29

## 2024-05-29 RX ORDER — DULOXETIN HYDROCHLORIDE 60 MG/1
CAPSULE, DELAYED RELEASE ORAL
Qty: 90 CAPSULE | Refills: 3 | Status: SHIPPED | OUTPATIENT
Start: 2024-05-29

## 2024-05-29 NOTE — PROGRESS NOTES
"Outpatient Psychiatry Follow-Up Visit    5/29/2024    Clinical Status of Patient:  Outpatient (Ambulatory)    Chief Complaint:  Cassandra Escobedo is a 29 y.o. female who presents today for follow-up of depression and anxiety. Patient last seen for follow-up on 10/5/2024. Met with patient.      TELE PSYCHIATRY Disclaimer   *The patient was informed despite using HIPPA compliant technology there may be risks including security breach, technological failure, inability to perform a comprehensive physical exam which could delay or prevent an accurate diagnosis, and potential complications from treatment decisions rendered over a telemedical platform.   The patient was also informed of the relationship between the physician and patient and the respective role of any other health care provider with respect to management of the patient; and notified that the pt may decline to receive medical services by telemedicine and may withdraw from such care at any time.     Patient's Current location: [facility] in [place] (in personal vehicle)  In Case of Emergency pts next of kin  Name:Latonia Escobedo  Phone number:  [phone]   Visit type: Virtual visit with synchronous audio and video  Total time spent with patient: 15 minutes    Interval History and Content of Current Session:  Interim Events/Subjective Report/Content of Current Session:   Pt reports doing "pretty good" overall.  Notes that she's trying to find a new job and got engaged with her current partner.  Looking at moving into a shared apartment.  Reports "good" mood, denies interim depression, improved anxiety.  Sleeping well, rested on awakening. Appetite fair, weight decreased (intentional).  Energy "up and down", motivation fair.  Occasional irritability (especially if she forgets to take cymbalta), denies hopelessness.  Denies SI/HI/AVH/paranoia, denies plan or desire for self harm or harm to others.  Reports SE from current regimen: low appetite from " "metformin. Denies change in chronic somatic complaints. Pt happy with current regimen and wants to continue.     Psychiatric Review of Systems-is patient experiencing or having changes in  See HPI above.     Review of Systems   PSYCHIATRIC: Pertinant items are noted in the narrative.  CONSTITUTIONAL: No weight gain or loss.  MUSCULOSKELETAL: No pain or stiffness of the joints.  NEUROLOGIC: No weakness, sensory changes, seizures, confusion, memory loss, tremor or other abnormal movements.  CARDIAC: No CP, no palpitations  RESPIRATORY: No shortness of breath.  CARDIOVASCULAR: No tachycardia or chest pain.  GASTROINTESTINAL: No nausea, vomiting, pain, constipation or diarrhea.    Past Medical, Family and Social History: The patient's past medical, family and social history have been reviewed and updated as appropriate within the electronic medical record - see encounter notes.    Compliance: good    Side effects: fatigue    Risk Parameters:  Patient reports no suicidal ideation  Patient reports no homicidal ideation  Patient reports no self-injurious behavior  Patient reports no violent behavior    Exam (detailed: at least 9 elements; comprehensive: all 15 elements)   Constitutional  Vitals:  Most recent vital signs, dated less than 90 days prior to this appointment, were reviewed.     There were no vitals filed for this visit.       General:   Constitutional: No acute distress, appears stated age, casually dressed    Neurologic:   Motor: moves all extremities spontaneously and without difficulty, no abnormal involuntary movements observed  Gait: normal gait and station    Mental status examination:   Appearance: appears stated age, casually dressed, no acute distress  Behavior: unremarkable for situation, calm and cooperative  Mood: "good"  Affect: mood congruent and euthymic  Thought process: linear and goal directed  Associations: appropriate for conversation  Thought content: no plan or desire for self harm or harm " to others, denies paranoia, no delusional ideation volunteered  Perceptions: denies hallucinations or other altered perceptions  Orientation: oriented to day of week, month, year, location, and situation  Language: English, fluid  Attention: able to attend to interview  Insight: good  Judgement: good    PHQ9:  Over the last two weeks how often have you been bothered by little interest or pleasure in doing things: 0  Over the last two weeks how often have you been bothered by feeling down, depressed or hopeless: 0  PHQ-2 Total Score: 0    GAD7:      3/30/2023    11:34 AM 11/30/2022     9:51 AM   GAD7   1. Feeling nervous, anxious, or on edge? 1 3   2. Not being able to stop or control worrying? 1 3   3. Worrying too much about different things? 1 3   4. Trouble relaxing? 0 3   5. Being so restless that it is hard to sit still? 0 3   6. Becoming easily annoyed or irritable? 0 3   7. Feeling afraid as if something awful might happen? 0 2   8. If you checked off any problems, how difficult have these problems made it for you to do your work, take care of things at home, or get along with other people? 0 2   YOSEF-7 Score 3 20     Assessment and Diagnosis   Status/Progress: Based on the examination today, the patient's problem(s) is/are well controlled.  New problems have not been presented today.   Co-morbidities and Lack of compliance are not complicating management of the primary condition.  Number of separate conditions addressed during today's visit: 2 (mood well controlled, anxiety well controlled).  Are medication adjustments being made today: No.  Are referral(s) being ordered today: No.  Complexity (level) of medical decision making employed in the encounter: LOW.    General Impression:    ICD-10-CM ICD-9-CM   1. Moderate episode of recurrent major depressive disorder  F33.1 296.32   2. YOSEF (generalized anxiety disorder)  F41.1 300.02     Intervention/Counseling/Treatment Plan   Continue cymbalta 90mg daily  No  need for PEC as pt is not an imminent danger to self or others or gravely disabled due to acute psychiatric illness  Discussed that pt should either call clinic for psychiatric crisis symptoms or present to nearest emergency room    Discussed with patient informed consent including diagnosis, risks and benefits of proposed treatment above vs. alternative treatments vs. no treatment, as well as serious and common side effects of these treatments, and the inherent unpredictability of individual responses to these treatments. The patient expresses understanding of the above and displays the capacity to agree with this current plan. Patient also agrees that, currently, the benefits outweigh the risks and would like to pursue treatment at this time, and had no other questions.    Instructions:  Take all medications as prescribed.    Abstain from recreational drugs and alcohol.  Present to ED or call 911 for SI/HI plan or intent, psychosis, or medical emergency.    Return to Clinic: Follow up in about 6 months (around 11/29/2024).    Total time:     The total time for services performed on the date of the encounter: 15 minutes    Leo Chaney MD  [facility]

## 2024-06-10 ENCOUNTER — OFFICE VISIT (OUTPATIENT)
Dept: NEUROLOGY | Facility: CLINIC | Age: 29
End: 2024-06-10
Payer: MEDICAID

## 2024-06-10 DIAGNOSIS — E66.01 CLASS 3 SEVERE OBESITY DUE TO EXCESS CALORIES WITHOUT SERIOUS COMORBIDITY WITH BODY MASS INDEX (BMI) OF 40.0 TO 44.9 IN ADULT: ICD-10-CM

## 2024-06-10 DIAGNOSIS — G43.001 MIGRAINE WITHOUT AURA AND WITH STATUS MIGRAINOSUS, NOT INTRACTABLE: Primary | ICD-10-CM

## 2024-06-10 PROCEDURE — 1160F RVW MEDS BY RX/DR IN RCRD: CPT | Mod: CPTII,95,, | Performed by: NURSE PRACTITIONER

## 2024-06-10 PROCEDURE — 99214 OFFICE O/P EST MOD 30 MIN: CPT | Mod: 95,,, | Performed by: NURSE PRACTITIONER

## 2024-06-10 PROCEDURE — 1159F MED LIST DOCD IN RCRD: CPT | Mod: CPTII,95,, | Performed by: NURSE PRACTITIONER

## 2024-06-10 RX ORDER — PROPRANOLOL HYDROCHLORIDE 80 MG/1
80 TABLET ORAL 2 TIMES DAILY
Qty: 60 TABLET | Refills: 6 | Status: SHIPPED | OUTPATIENT
Start: 2024-06-10 | End: 2025-06-10

## 2024-06-10 RX ORDER — PENICILLIN V POTASSIUM 500 MG/1
500 TABLET, FILM COATED ORAL
COMMUNITY
Start: 2024-04-15 | End: 2024-06-10

## 2024-06-10 RX ORDER — RIZATRIPTAN BENZOATE 10 MG/1
10 TABLET ORAL 2 TIMES DAILY PRN
Qty: 9 TABLET | Refills: 6 | Status: SHIPPED | OUTPATIENT
Start: 2024-06-10 | End: 2024-07-10

## 2024-06-10 NOTE — PROGRESS NOTES
[facility] Neurology Follow Up Office Visit Note    Initial Visit Date: 8/18/2023  Last Visit Date: 12/5/2023  Current Visit Date:  06/10/2024    Chief Complaint:     Chief Complaint   Patient presents with    Migraine       History of Present Illness:      This is 29 y.o. female with history of anxiety, type 2 diabetes, allergic rhinitis who was referred for episodic migraine without aura. During last visit, propranolol 80 mg BID, TPM 25 mg Q day, duloxetine 90 mg daily and Rizatriptan 10 mg BID were continued.    Today,  Pt states she takes TPM 25 mg PRN, propranolol 80 mg BID and duloxetine 90 mg daily. Rizatriptan effective as abortive therapy, does not require second dose. May average 1 migraine/week. Followed by mental health for depression and finds therapy helpful. States she is walking daily, weather permitting.    Age of Onset : 9 years old      Headache Description:   right frontotemporal, pounding, severe, impeding day to day activity, lasting up to 7 days, with nausea and photophobia  Bifrontal, dull, moderate, not impeding day to day activity, constant with nausea      Frequency: 3 migraine headache days per month    Provocation Factors: not on propranolol      Risk Factors  - Family history of headache disorder: Yes maternal grandmother and mother with headache.  - History of focal CNS lesions: No  - History of CNS infections: No  - History head trauma: No  - History of underlying mood disorder: Yes anxiety disorder.  Follows with Dr. Chaney.  - History of sleep disorder: Yes not well  - Recreational drug use: Yes marijuana  - Tobacco use: Yes occasional  - Alcohol use: Yes occasional  - Weight fluctuation: Yes dropped 10 lbs for the past 1 month.  - Isotretinoin or Tetracycline use:  No  - Family planning and contraceptive use: Yes not sexually active. However, does plan on becoming pregnant when she becomes sexually active in the future.      Medications:     Current Prophylactic  Duloxetine 90 mg daily  (11/30/2022 to present)  Propanolol 80 mg twice a day (8/18/2023 to present): effective   Topiramate 25 mg daily (8/21/2023 to present): taking it as needed. Effective. Sedating;.     Current Abortive  Ibuprofen 800 mg TID PRN: ineffective   Rizatriptan 10 mg twice a day as needed (8/18/2023 to present): effective.     Prior Prophylactic  Topiramate 50 mg daily (8/18/2023 - 8/21/2023): sedation     Prior Abortive  Denied     Devices:     - VNS:  - TNS  - TMS:     Procedures:     - Botox:  - PSG block:   - Occipital nerve block:     Labs:     Results for orders placed or performed in visit on 01/19/24    DIABETES EYE EXAM   Result Value Ref Range    Left Eye DM Retinopathy Negative     Right Eye DM Retinopathy Negative        Studies:     - MRI Brainwithout contrast 10/17/2022: I have reviewed the study independently and with the patient.  Unremarkable.   - MRA Head w/o Edd:   - MRV Head w/o Edd:   - NCHCT:  - Lumbar Puncture:    Review of Systems:     Review of Systems   All other systems reviewed and are negative.  As per HPI    Physical Exams:     There were no vitals filed for this visit.    Physical Exam  Vitals and nursing note reviewed.   Constitutional:       Appearance: Normal appearance. She is obese.   HENT:      Head: Normocephalic and atraumatic.      Right Ear: External ear normal.      Left Ear: External ear normal.      Nose: Nose normal.      Mouth/Throat:      Mouth: Mucous membranes are moist.      Pharynx: Oropharynx is clear.   Eyes:      Conjunctiva/sclera: Conjunctivae normal.   Pulmonary:      Effort: Pulmonary effort is normal.   Abdominal:      General: There is no distension.      Tenderness: There is no guarding.   Musculoskeletal:         General: Normal range of motion.      Cervical back: Normal range of motion.   Skin:     Coloration: Skin is not jaundiced.      Findings: No lesion or rash.   Neurological:      Mental Status: She is alert.     Comprehensive Neurological Exam:  Mental  Status: Alert Oriented to Self, Date, and Place. Comprehension wnl. No dysarthria.   CN II - XII: No ptosis OU, EOMI without nystagmus, Hearing grossly intact, Face Symmetric, Tongue and Uvula midline, Trapezius symmetric bilateral.   Motor: tone and bulk wnl throughout, no abnormal involuntary or voluntary movements, no satelliting, Fine finger movements wnl b/l, No pronator drift.   Sensory: JHONY due to nature of visit  Reflexes: JHONY due to nature of visit  Cerebellar: FNF wnl b/l, RAHM wnl b/l  Romberg: Negative  Gait: normal. Heel Gait, Toe Gait, Tandem Gait wnl.     Assessment:     This is 29 y.o. female with history of anxiety, type 2 diabetes, allergic rhinitis who was referred for episodic migraine without aura.  Average 1 migraine per week that is resolved with rizatriptan. Taking TPM PRN instead of nightly. Admits to snoring, but denies AM headache or dry mouth. Has never been tested for MARJAN, does not wish to proceed at this time. Walking daily.    Problem List Items Addressed This Visit          Neuro    Migraine without aura and with status migrainosus, not intractable - Primary    Relevant Medications    propranoloL (INDERAL) 80 MG tablet    rizatriptan (MAXALT) 10 MG tablet     Other Visit Diagnoses       Class 3 severe obesity due to excess calories without serious comorbidity with body mass index (BMI) of 40.0 to 44.9 in adult                Plan:     [] c/w Duloxetine 90 mg daily   [] c/w Propanolol 80 mg twice a day  [] c/w Topiramate 25 mg at bedtime as needed  [] c/w Rizatripan 10 mg twice a day as needed  [] handout on hydration  [] call office for migraine > 24 hrs and failed abortive therapy; will call in HA cocktail  [] consider PSG in future for snoring      RTC 6 months - TM    Headache education provided: good sleep hygiene and 7 hours of sleep per night, stress management, medication overuse education provided. Using more 3 OTC per week may worsen headaches, high intensity interval  training has shown to reduce headache frequency. Low carb, high protein has shown to reduce headache frequency. Patient is instructed in keep headache diary.     I have explained the treatment plan, diagnosis, and prognosis to patient. All questions are answered to the best of my knowledge.     Face to face time 30 minutes, including documentation, chart review, counseling, education, review of test results, relevant medical records, and coordination of care.     Maricel Weems, GLADYS-C  General Neurology  06/10/2024

## 2024-06-25 ENCOUNTER — OFFICE VISIT (OUTPATIENT)
Dept: OTOLARYNGOLOGY | Facility: CLINIC | Age: 29
End: 2024-06-25
Payer: MEDICAID

## 2024-06-25 VITALS — OXYGEN SATURATION: 99 % | HEIGHT: 64 IN | WEIGHT: 240.94 LBS | RESPIRATION RATE: 18 BRPM | BODY MASS INDEX: 41.13 KG/M2

## 2024-06-25 DIAGNOSIS — Z12.4 CERVICAL CANCER SCREENING: Primary | ICD-10-CM

## 2024-06-25 DIAGNOSIS — R09.81 NASAL CONGESTION: ICD-10-CM

## 2024-06-25 DIAGNOSIS — J32.9 CHRONIC RHINOSINUSITIS: Primary | ICD-10-CM

## 2024-06-25 PROCEDURE — 99213 OFFICE O/P EST LOW 20 MIN: CPT | Mod: PBBFAC | Performed by: STUDENT IN AN ORGANIZED HEALTH CARE EDUCATION/TRAINING PROGRAM

## 2024-06-25 NOTE — PROGRESS NOTES
[facility]  Otolaryngology Clinic Note    Cassandra Escobedo  Encounter Date: 6/25/2024  YOB: 1995  Physician:     Chief Complaint: Headaches    HPI: Cassandra Escobedo is a 29 y.o. female with pmh of anxiety and depression who presents to clinic for evaluation of headaches and sinusitis. She states that the headaches began 2 years ago and have increased in intensity over the last year.  Pain begins on the rt side of the face and moves to the lt. The headaches are associated with rhinorrhea, tinnitus, N/V, watery eyes and dizziness. She has also been experiencing new onset epistaxis (about 5 in the last several weeks). She has been given propanolol in the past for the headaches but states that it does not provide relief. The headaches are triggered by caffeine, alcohol, chocolate and stress. MRI performed on 10/17/22 showed a left maxillary sinus mucosal retention cyst w/ no intracranial abnormalities. She has used Flonase and cetirizine in the past with minimal relief. Pertinent family history includes migraines (maternal aunt) and allergic rhinitis (mother). Referral to neurology pending.     1/12/23:  Here today for follow-up.  Patient states she is feeling better since last seen.  She is having her headache and facial every couple of days every day.  She still does have fairly frequent migraines. She has neurology appt. She has been using flonse, nasal saline, and antishistamines with mild improvement. Still endorses occasional rhinorrhea and facial pain. Sense of smell is okay. No nasal obstruction and can breathe well through nose.     2/23/23:  Presents today for follow up of her chronic rhinosinusitis.  She is doing well today.  Only complaint is intermittent sharp pains in the frontal area usually associated with her migraines.  She denies nasal congestion, rhinorrhea, hyposmia, post-nasal drip, and facial pain over the maxillary isnuses.  She has been using flonase,  nasal saline and antihistamines.    24:  Patient here today for follow up of her chronic rhinosinusitis.  She is doing well today.  She denies nasal congestion, rhinorrhea, hyposmia, post-nasal drip, and facial pain over the maxillary isnuses.  She has been using flonase and antihistamines.    ROS:   General: Negative except per HPI  Skin: Denies rash, ulcer, or lesion.  Eyes: Denies vision changes or diplopia.  Ears: Negative except per HPI  Nose: Negative except per HPI  Throat/mouth: Negative except per HPI  Cardiovascular: Negative except per HPI  Respiratory: Negative except per HPI  Neck: Negative except per HPI  Endocrine: Negative except per HPI  Neurologic: Negative except per HPI    Other 10-point review of systems negative except per HPI      Review of patient's allergies indicates:   Allergen Reactions    Phenergan plain     Promethazine     Sulfa (sulfonamide antibiotics)        Past Medical History:   Diagnosis Date    Hypertension     Migraine headache        Past Surgical History:   Procedure Laterality Date     SECTION  2018     SECTION      cycst removal from left wrist      elbow Right     ELBOW SURGERY Right        Social History     Socioeconomic History    Marital status: Single   Occupational History    Occupation: [employer] / [employer]   Tobacco Use    Smoking status: Former     Current packs/day: 0.00     Average packs/day: 0.3 packs/day for 3.3 years (0.8 ttl pk-yrs)     Types: Cigarettes     Start date:      Quit date: 2016     Years since quittin.1     Passive exposure: Past    Smokeless tobacco: Never   Substance and Sexual Activity    Alcohol use: Not Currently     Comment: rarely    Drug use: Yes     Types: Marijuana     Comment: as needed    Sexual activity: Not Currently     Birth control/protection: None     Social Determinants of Health     Financial Resource Strain: Low Risk  (2023)    Overall Financial Resource Strain  (CARDIA)     Difficulty of Paying Living Expenses: Not very hard   Food Insecurity: No Food Insecurity (12/20/2023)    Hunger Vital Sign     Worried About Running Out of Food in the Last Year: Never true     Ran Out of Food in the Last Year: Never true   Transportation Needs: No Transportation Needs (12/20/2023)    PRAPARE - Transportation     Lack of Transportation (Medical): No     Lack of Transportation (Non-Medical): No   Physical Activity: Sufficiently Active (12/20/2023)    Exercise Vital Sign     Days of Exercise per Week: 6 days     Minutes of Exercise per Session: 150+ min   Stress: No Stress Concern Present (12/20/2023)    [employer] - Occupational Stress Questionnaire     Feeling of Stress : Not at all   Housing Stability: Low Risk  (12/20/2023)    Housing Stability Vital Sign     Unable to Pay for Housing in the Last Year: No     Number of Places Lived in the Last Year: 1     Unstable Housing in the Last Year: No       Family History   Problem Relation Name Age of Onset    Hyperlipidemia Mother      Hypertension Mother      Diabetes Mother      Hyperlipidemia Father         Outpatient Encounter Medications as of 6/25/2024   Medication Sig Dispense Refill    cetirizine (ZYRTEC) 10 MG tablet   See Instructions, TAKE ONE TABLET BY MOUTH EVERY DAY, # 30 tab(s), 1 Refill(s), Pharmacy: [facility], 163, cm, Height/Length Dosing, 07/05/22 11:37:00 CDT, 113.1, kg, Weight Dosing, 07/05/22 11:37:00 CDT      DULoxetine (CYMBALTA) 30 MG capsule Take 30mg+ 60mg by mouth daily. 90 capsule 3    DULoxetine (CYMBALTA) 60 MG capsule Take 30mg+ 60mg by mouth daily. 90 capsule 3    metFORMIN (GLUCOPHAGE) 500 MG tablet Take 1 tablet (500 mg total) by mouth 2 (two) times daily with meals. For Diabetes 180 tablet 1    propranoloL (INDERAL) 80 MG tablet Take 1 tablet (80 mg total) by mouth 2 (two) times daily. 60 tablet 6    rizatriptan (MAXALT) 10 MG tablet Take 1 tablet (10 mg total) by mouth 2  "(two) times daily as needed for Migraine. 9 tablet 6    topiramate (TOPAMAX) 25 MG tablet Take 1 tablet (25 mg total) by mouth every evening. 30 tablet 3     No facility-administered encounter medications on file as of 6/25/2024.       Physical Exam:  Vitals:    06/25/24 1506   Resp: 18   SpO2: 99%   Weight: 109.3 kg (240 lb 15.4 oz)   Height: 5' 4" (1.626 m)       Constitutional  General Appearance: well nourished, well-developed, AAO x3, NAD  HEENT  Eyes: EOMI, normal conjunctivae  Ears: Hearing well at conversation level   AD: auricle normal, EAC normal, TM intact, no EUGENE   AS: auricle normal, EAC normal, TM intact, no EUGENE   Vestibular: ambulates without gait disturbance  Nose: septum midline with swell body noted , mild inferior turbinate hypertrophy, no polyps, mucosa with erythema  OC/OP: dentition moderate, no oral lesions, tongue/FOM/BOT- soft, no leukoplakia/ulcerations/ tenderness, tonsils +  Nasopharynx, Hypopharynx, and Larynx:    Indirect: attempted, limited view due to patient intolerance  Neck: soft, non-tender, no palpable lymph nodes   Thyroid region- no nodules or goiter  Neuro: CN II - XII intact bilaterally  Respiratory: non-labored respirations  Psychiatric: oriented to time, place and person, no depression, anxiety or agitation    Pertinent Data:  ? LABS:  ? AUDIO:  ? CT Scan:    Imaging:   I personally reviewed the following images:    CT sinus:  Opacification of the right and left maxillary sinuses will mild narrowing of the osteomeatal complex.  The frontal, ethmoid and sphenoid sinuses are well aerated.  The nasal septum does not have significant deviation to either side.  Inferior turbinates are mildly hypertrophied.    Summary of Outside Records:      Assessment/Plan:  Cassandra Escobedo is a 27 y.o. female with pmh of anxiety, depression, migraines and CRS with predominantly bilateral maxillary sinusitis.  She has been optimized on medical therapy.  Still with intermittent headaches and " facial pain.    - Continue flonase, saline intranasal, and anithistamines.  - Discussed surgical management and recommended anterior ethmoidectomy with maxillary antrostomies bilateral.  Also counseled patient that we could not guarantee that surgery with drastically improve her headaches.  Patient is not currently interested in surgery and would like to continue to with medical therapy at this time.   - RTC 6 months to evaluate long term symptom control with medical therapy       Jonathan Buitrago MD  [facility] Otolaryngology PGY IV

## 2024-06-27 NOTE — PROGRESS NOTES
I have reviewed and agree with the resident's findings, including all diagnostic interpretations and plans as written.     Poncho Flores M.D.

## 2024-07-16 ENCOUNTER — TELEPHONE (OUTPATIENT)
Dept: INTERNAL MEDICINE | Facility: CLINIC | Age: 29
End: 2024-07-16
Payer: MEDICAID

## 2024-07-17 NOTE — TELEPHONE ENCOUNTER
I contacted patient and informed provider FREDDIE dickerson approved virtual with lab prior.Patient voiced understanding.

## 2024-08-09 ENCOUNTER — PATIENT MESSAGE (OUTPATIENT)
Dept: INTERNAL MEDICINE | Facility: CLINIC | Age: 29
End: 2024-08-09
Payer: MEDICAID

## 2024-08-14 ENCOUNTER — LAB VISIT (OUTPATIENT)
Dept: LAB | Facility: HOSPITAL | Age: 29
End: 2024-08-14
Attending: NURSE PRACTITIONER
Payer: MEDICAID

## 2024-08-14 DIAGNOSIS — E11.9 TYPE 2 DIABETES MELLITUS WITHOUT COMPLICATION, WITHOUT LONG-TERM CURRENT USE OF INSULIN: ICD-10-CM

## 2024-08-14 LAB
ALBUMIN SERPL-MCNC: 3.9 G/DL (ref 3.4–5)
ALBUMIN/GLOB SERPL: 1.2 RATIO
ALP SERPL-CCNC: 67 UNIT/L (ref 50–144)
ALT SERPL-CCNC: 160 UNIT/L (ref 1–45)
AMORPH URATE CRY URNS QL MICRO: ABNORMAL /HPF
ANION GAP SERPL CALC-SCNC: 8 MEQ/L (ref 2–13)
AST SERPL-CCNC: 66 UNIT/L (ref 14–36)
BACTERIA #/AREA URNS AUTO: ABNORMAL /HPF
BASOPHILS # BLD AUTO: 0.03 X10(3)/MCL (ref 0.01–0.08)
BASOPHILS NFR BLD AUTO: 0.4 % (ref 0.1–1.2)
BILIRUB SERPL-MCNC: 0.3 MG/DL (ref 0–1)
BILIRUB UR QL STRIP.AUTO: NEGATIVE
BUN SERPL-MCNC: 13 MG/DL (ref 7–20)
CALCIUM SERPL-MCNC: 9.4 MG/DL (ref 8.4–10.2)
CHLORIDE SERPL-SCNC: 106 MMOL/L (ref 98–110)
CHOLEST SERPL-MCNC: 235 MG/DL (ref 0–200)
CLARITY UR: ABNORMAL
CO2 SERPL-SCNC: 23 MMOL/L (ref 21–32)
COLOR UR AUTO: YELLOW
CREAT SERPL-MCNC: 0.58 MG/DL (ref 0.66–1.25)
CREAT UR-MCNC: 131.4 MG/DL (ref 45–106)
CREAT/UREA NIT SERPL: 22 (ref 12–20)
EOSINOPHIL # BLD AUTO: 0.22 X10(3)/MCL (ref 0.04–0.36)
EOSINOPHIL NFR BLD AUTO: 3.1 % (ref 0.7–7)
ERYTHROCYTE [DISTWIDTH] IN BLOOD BY AUTOMATED COUNT: 14.1 % (ref 11–14.5)
EST. AVERAGE GLUCOSE BLD GHB EST-MCNC: 157.1 MG/DL (ref 70–115)
GFR SERPLBLD CREATININE-BSD FMLA CKD-EPI: >90 ML/MIN/1.73/M2
GLOBULIN SER-MCNC: 3.2 GM/DL (ref 2–3.9)
GLUCOSE SERPL-MCNC: 157 MG/DL (ref 70–115)
GLUCOSE UR QL STRIP: NEGATIVE
HBA1C MFR BLD: 7.1 % (ref 4–6)
HCT VFR BLD AUTO: 38.9 % (ref 36–48)
HDLC SERPL-MCNC: 48 MG/DL (ref 40–60)
HGB BLD-MCNC: 12.8 G/DL (ref 11.8–16)
HGB UR QL STRIP: ABNORMAL
IMM GRANULOCYTES # BLD AUTO: 0.01 X10(3)/MCL (ref 0–0.03)
IMM GRANULOCYTES NFR BLD AUTO: 0.1 % (ref 0–0.5)
KETONES UR QL STRIP: NEGATIVE
LDLC SERPL DIRECT ASSAY-SCNC: 145.1 MG/DL (ref 30–100)
LEUKOCYTE ESTERASE UR QL STRIP: ABNORMAL
LYMPHOCYTES # BLD AUTO: 3.25 X10(3)/MCL (ref 1.16–3.74)
LYMPHOCYTES NFR BLD AUTO: 46.3 % (ref 20–55)
MCH RBC QN AUTO: 26.6 PG (ref 27–34)
MCHC RBC AUTO-ENTMCNC: 32.9 G/DL (ref 31–37)
MCV RBC AUTO: 80.7 FL (ref 79–99)
MICROALBUMIN UR-MCNC: 10.7 UG/ML
MICROALBUMIN/CREAT RATIO PNL UR: 8.1 MG/GM CR (ref 0–30)
MONOCYTES # BLD AUTO: 0.44 X10(3)/MCL (ref 0.24–0.36)
MONOCYTES NFR BLD AUTO: 6.3 % (ref 4.7–12.5)
NEUTROPHILS # BLD AUTO: 3.07 X10(3)/MCL (ref 1.56–6.13)
NEUTROPHILS NFR BLD AUTO: 43.8 % (ref 37–73)
NITRITE UR QL STRIP: NEGATIVE
NRBC BLD AUTO-RTO: 0 %
PH UR STRIP: 6 [PH]
PLATELET # BLD AUTO: 234 X10(3)/MCL (ref 140–371)
PMV BLD AUTO: 11 FL (ref 9.4–12.4)
POTASSIUM SERPL-SCNC: 4.1 MMOL/L (ref 3.5–5.1)
PROT SERPL-MCNC: 7.1 GM/DL (ref 6.3–8.2)
PROT UR QL STRIP: NEGATIVE
RBC # BLD AUTO: 4.82 X10(6)/MCL (ref 4–5.1)
RBC #/AREA URNS AUTO: ABNORMAL /HPF
SODIUM SERPL-SCNC: 137 MMOL/L (ref 136–145)
SP GR UR STRIP.AUTO: >=1.03 (ref 1–1.03)
SQUAMOUS #/AREA URNS AUTO: ABNORMAL /HPF
TRIGL SERPL-MCNC: 244 MG/DL (ref 30–200)
UROBILINOGEN UR STRIP-ACNC: 0.2
WBC # BLD AUTO: 7.02 X10(3)/MCL (ref 4–11.5)
WBC #/AREA URNS AUTO: ABNORMAL /HPF

## 2024-08-14 PROCEDURE — 82570 ASSAY OF URINE CREATININE: CPT

## 2024-08-14 PROCEDURE — 80061 LIPID PANEL: CPT

## 2024-08-14 PROCEDURE — 36415 COLL VENOUS BLD VENIPUNCTURE: CPT

## 2024-08-14 PROCEDURE — 80053 COMPREHEN METABOLIC PANEL: CPT

## 2024-08-14 PROCEDURE — 82043 UR ALBUMIN QUANTITATIVE: CPT

## 2024-08-14 PROCEDURE — 85025 COMPLETE CBC W/AUTO DIFF WBC: CPT

## 2024-08-14 PROCEDURE — 83036 HEMOGLOBIN GLYCOSYLATED A1C: CPT

## 2024-08-14 PROCEDURE — 87086 URINE CULTURE/COLONY COUNT: CPT

## 2024-08-14 PROCEDURE — 81003 URINALYSIS AUTO W/O SCOPE: CPT

## 2024-08-14 PROCEDURE — 81015 MICROSCOPIC EXAM OF URINE: CPT

## 2024-08-16 LAB — BACTERIA UR CULT: NORMAL

## 2024-08-27 ENCOUNTER — OFFICE VISIT (OUTPATIENT)
Dept: INTERNAL MEDICINE | Facility: CLINIC | Age: 29
End: 2024-08-27
Payer: MEDICAID

## 2024-08-27 VITALS
RESPIRATION RATE: 16 BRPM | DIASTOLIC BLOOD PRESSURE: 86 MMHG | BODY MASS INDEX: 40.06 KG/M2 | WEIGHT: 234.63 LBS | SYSTOLIC BLOOD PRESSURE: 125 MMHG | HEART RATE: 76 BPM | HEIGHT: 64 IN | TEMPERATURE: 98 F

## 2024-08-27 DIAGNOSIS — E11.9 TYPE 2 DIABETES MELLITUS WITHOUT COMPLICATION, WITHOUT LONG-TERM CURRENT USE OF INSULIN: Primary | ICD-10-CM

## 2024-08-27 DIAGNOSIS — R79.89 ELEVATED LFTS: ICD-10-CM

## 2024-08-27 DIAGNOSIS — Z23 IMMUNIZATION DUE: ICD-10-CM

## 2024-08-27 DIAGNOSIS — Z12.4 CERVICAL CANCER SCREENING: ICD-10-CM

## 2024-08-27 PROCEDURE — 99214 OFFICE O/P EST MOD 30 MIN: CPT | Mod: PBBFAC,25 | Performed by: NURSE PRACTITIONER

## 2024-08-27 PROCEDURE — 2023F DILAT RTA XM W/O RTNOPTHY: CPT | Mod: CPTII,,, | Performed by: NURSE PRACTITIONER

## 2024-08-27 PROCEDURE — 90471 IMMUNIZATION ADMIN: CPT | Mod: PBBFAC

## 2024-08-27 PROCEDURE — 90677 PCV20 VACCINE IM: CPT | Mod: PBBFAC

## 2024-08-27 PROCEDURE — 3061F NEG MICROALBUMINURIA REV: CPT | Mod: CPTII,,, | Performed by: NURSE PRACTITIONER

## 2024-08-27 PROCEDURE — 3066F NEPHROPATHY DOC TX: CPT | Mod: CPTII,,, | Performed by: NURSE PRACTITIONER

## 2024-08-27 PROCEDURE — 1160F RVW MEDS BY RX/DR IN RCRD: CPT | Mod: CPTII,,, | Performed by: NURSE PRACTITIONER

## 2024-08-27 PROCEDURE — 99395 PREV VISIT EST AGE 18-39: CPT | Mod: S$PBB,,, | Performed by: NURSE PRACTITIONER

## 2024-08-27 PROCEDURE — 3008F BODY MASS INDEX DOCD: CPT | Mod: CPTII,,, | Performed by: NURSE PRACTITIONER

## 2024-08-27 PROCEDURE — 3074F SYST BP LT 130 MM HG: CPT | Mod: CPTII,,, | Performed by: NURSE PRACTITIONER

## 2024-08-27 PROCEDURE — 3079F DIAST BP 80-89 MM HG: CPT | Mod: CPTII,,, | Performed by: NURSE PRACTITIONER

## 2024-08-27 PROCEDURE — 1159F MED LIST DOCD IN RCRD: CPT | Mod: CPTII,,, | Performed by: NURSE PRACTITIONER

## 2024-08-27 PROCEDURE — 3051F HG A1C>EQUAL 7.0%<8.0%: CPT | Mod: CPTII,,, | Performed by: NURSE PRACTITIONER

## 2024-08-27 RX ORDER — METFORMIN HYDROCHLORIDE 500 MG/1
500 TABLET ORAL 2 TIMES DAILY WITH MEALS
Qty: 180 TABLET | Refills: 2 | Status: SHIPPED | OUTPATIENT
Start: 2024-08-27 | End: 2025-05-24

## 2024-08-27 RX ADMIN — PNEUMOCOCCAL 20-VALENT CONJUGATE VACCINE 0.5 ML
2.2; 2.2; 2.2; 2.2; 2.2; 2.2; 2.2; 2.2; 2.2; 2.2; 2.2; 2.2; 2.2; 2.2; 2.2; 2.2; 4.4; 2.2; 2.2; 2.2 INJECTION, SUSPENSION INTRAMUSCULAR at 11:08

## 2024-08-27 NOTE — ASSESSMENT & PLAN NOTE
US ABD limited ordered  Repeat CMP in 3 months with virtual visit for eval  ED Precautions  Tylenol and EtOH abstinences

## 2024-08-27 NOTE — PROGRESS NOTES
GRACY Ambrosio   [facility]  [address] [facility] [address] [address]      PATIENT NAME: Cassandra Escobedo  : 1995  DATE: 24  MRN: [id number]      Reason for Visit / Chief Complaint: Diabetes (Taking diabetes medication, lab review, pap due pt waiting for obgyn dr. Hansen to set up appt. )       History of Present Illness / Problem Focused Workflow     Cassandra Escobedo presents to the clinic with Diabetes (Taking diabetes medication, lab review, pap due pt waiting for obgyn dr. Hansen to set up appt. )     28 yo WF here today for f/u. Medical problems include T2DM, Migraines & anxiety. Followed by [facility] and Dr. Chaney.     Cervical Cancer Screening: [facility] F/U   Osteoporosis Screenin23 Vitamin D Level   Diabetic Screening: [facility]   HCV Screening: 10/03/22    2023  Pt here today for yearly wellness OV, will complete labs after today's visit. Pt reports compliance with all mediations, metformin refilled x 1 month for pt today. Aware of routine scheduled speciatly appointments. Will f/u in1 month via virtual for lab review and med refills. Denies any acute issues today. Agreeable to Fundal exam today.   Denies chest pain, shortness of breath, cough, fever, headache, dizziness, weakness, abdominal pain, nausea, vomiting, diarrhea, constipation, black/bloody stools, unplanned weight loss, night sweats, changes in urinary patterns, burning/odor with urination, depression, anxiety, and SI/HI.     2024  Pt here today for f/u via virtual for lab review. Labs reviewed and discussed with no questions or concerns at this time. A1C at goal of 6.2. Will continue current medication regiment at this time. Meds reviewed and refilled appropriately. We will f/u in 6 months with routine labs via virtual.   Denies chest pain, shortness of breath, cough, fever, headache, dizziness, weakness, abdominal pain,  nausea, vomiting, diarrhea, constipation, black/bloody stools, unplanned weight loss, night sweats, changes in urinary patterns, burning/odor with urination, depression, anxiety, and SI/HI.     08/27/2024  Pt here today for T2DM f/u with labs completed; labs reviewed discussed with no questions or concerns regarding diabetes, A1c near goal, patient reports compliance with medications.  Discussed with patient elevated FLP as well as elevated liver function tests.  Patient denies any alcohol as of recently or excessive use of Tylenol.  She is agreeable to ultrasound of the liver for evaluation and also repeat CMP in about 3 months.  We will hold off on initiating statin therapy due to liver function tests at this time.  Health maintenance topics reviewed, patient agreeable to referral for Pap smear.  Denies any other acute issues today.            Review of Systems     Review of Systems   Constitutional: Negative.    HENT: Negative.     Eyes: Negative.    Respiratory: Negative.     Cardiovascular: Negative.    Gastrointestinal: Negative.    Endocrine: Negative.    Genitourinary: Negative.    Neurological: Negative.    Psychiatric/Behavioral: Negative.           Medications and Allergies     Medications  Medication List with Changes/Refills   Current Medications    CETIRIZINE (ZYRTEC) 10 MG TABLET      See Instructions, TAKE ONE TABLET BY MOUTH EVERY DAY, # 30 tab(s), 1 Refill(s), Pharmacy: [facility], 163, cm, Height/Length Dosing, 07/05/22 11:37:00 CDT, 113.1, kg, Weight Dosing, 07/05/22 11:37:00 CDT    DULOXETINE (CYMBALTA) 30 MG CAPSULE    Take 30mg+ 60mg by mouth daily.    DULOXETINE (CYMBALTA) 60 MG CAPSULE    Take 30mg+ 60mg by mouth daily.    PROPRANOLOL (INDERAL) 80 MG TABLET    Take 1 tablet (80 mg total) by mouth 2 (two) times daily.    RIZATRIPTAN (MAXALT) 10 MG TABLET    Take 1 tablet (10 mg total) by mouth 2 (two) times daily as needed for Migraine.    TOPIRAMATE (TOPAMAX) 25 MG TABLET    Take 1 tablet (25  mg total) by mouth every evening.   Changed and/or Refilled Medications    Modified Medication Previous Medication    METFORMIN (GLUCOPHAGE) 500 MG TABLET metFORMIN (GLUCOPHAGE) 500 MG tablet       Take 1 tablet (500 mg total) by mouth 2 (two) times daily with meals. For Diabetes    Take 1 tablet (500 mg total) by mouth 2 (two) times daily with meals. For Diabetes         Allergies  Review of patient's allergies indicates:   Allergen Reactions    Phenergan plain     Promethazine     Sulfa (sulfonamide antibiotics)        Physical Examination     Vitals:    08/27/24 1110   BP: 125/86   Pulse: 76   Resp: 16   Temp: 97.6 °F (36.4 °C)     Physical Exam  Vitals reviewed.   Constitutional:       Appearance: Normal appearance. She is obese.   HENT:      Head: Normocephalic.   Cardiovascular:      Rate and Rhythm: Normal rate and regular rhythm.      Pulses: Normal pulses.      Heart sounds: Normal heart sounds.   Pulmonary:      Effort: Pulmonary effort is normal.      Breath sounds: Normal breath sounds.   Abdominal:      General: Abdomen is flat.      Palpations: Abdomen is soft.   Musculoskeletal:         General: Normal range of motion.      Cervical back: Normal range of motion.   Skin:     General: Skin is warm and dry.   Neurological:      Mental Status: She is alert.   Psychiatric:         Mood and Affect: Mood normal.           Results     Lab Results   Component Value Date    WBC 7.02 08/14/2024    RBC 4.82 08/14/2024    HGB 12.8 08/14/2024    HCT 38.9 08/14/2024    MCV 80.7 08/14/2024    MCH 26.6 (L) 08/14/2024    MCHC 32.9 08/14/2024    RDW 14.1 08/14/2024     08/14/2024    MPV 11.0 08/14/2024     Sodium   Date Value Ref Range Status   08/14/2024 137 136 - 145 mmol/L Final     Potassium   Date Value Ref Range Status   08/14/2024 4.1 3.5 - 5.1 mmol/L Final     Chloride   Date Value Ref Range Status   08/14/2024 106 98 - 110 mmol/L Final     CO2   Date Value Ref Range Status   08/14/2024 23 21 - 32 mmol/L  Final     Blood Urea Nitrogen   Date Value Ref Range Status   08/14/2024 13 7.0 - 20.0 mg/dL Final     Creatinine   Date Value Ref Range Status   08/14/2024 0.58 (L) 0.66 - 1.25 mg/dL Final     Calcium   Date Value Ref Range Status   08/14/2024 9.4 8.4 - 10.2 mg/dL Final     Albumin   Date Value Ref Range Status   08/14/2024 3.9 3.4 - 5.0 g/dL Final     Bilirubin Total   Date Value Ref Range Status   08/14/2024 0.3 0.0 - 1.0 mg/dL Final     ALP   Date Value Ref Range Status   08/14/2024 67 50 - 144 unit/L Final     AST   Date Value Ref Range Status   08/14/2024 66 (H) 14 - 36 unit/L Final     ALT   Date Value Ref Range Status   08/14/2024 160 (H) 1 - 45 unit/L Final     eGFR   Date Value Ref Range Status   09/15/2021 159 mL/min/1.73 m2 Final     Lab Results   Component Value Date    CHOL 235 (H) 08/14/2024     Lab Results   Component Value Date    HDL 48 08/14/2024     Lab Results   Component Value Date    TRIG 244 (H) 08/14/2024     Lab Results   Component Value Date    VLDL 44 12/21/2023     Lab Results   Component Value Date    .00 12/21/2023     Lab Results   Component Value Date    TSH 0.701 12/21/2023     Lab Results   Component Value Date    PHUR 6.0 08/14/2024    PROTEINUA Negative 08/14/2024    GLUCUA Negative 08/14/2024    OCCULTUA Large (A) 08/14/2024    NITRITE Negative 08/14/2024    LEUKOCYTESUR Small (A) 08/14/2024     Lab Results   Component Value Date    HGBA1C 7.1 (H) 08/14/2024    HGBA1C 6.2 12/21/2023    HGBA1C 7.1 (H) 05/05/2023           Assessment         ICD-10-CM ICD-9-CM   1. Type 2 diabetes mellitus without complication, without long-term current use of insulin  E11.9 250.00   2. Immunization due  Z23 V05.9   3. Cervical cancer screening  Z12.4 V76.2   4. Elevated LFTs  R79.89 790.6       Plan      Problem List Items Addressed This Visit          Endocrine    Type 2 diabetes mellitus without complication, without long-term current use of insulin - Primary    Overview                 Current Assessment & Plan     A1C at Goal  Continue Metformin 500 mg BID  6 month f/u with labs via virtual   Follow ADA diet.  Avoid soda, simple sweets, and limit rice/pasta/bread/starches and consume brown options when possible.   Maintain healthy weight with BMI goal <30.   Perform aerobic exercise for 150 minutes per week (or 5 days a week for 30 minutes each day).   Examine feet daily.     Lab Results   Component Value Date    HGBA1C 7.1 (H) 08/14/2024              Relevant Medications    metFORMIN (GLUCOPHAGE) 500 MG tablet       GI    Elevated LFTs    Current Assessment & Plan     US ABD limited ordered  Repeat CMP in 3 months with virtual visit for eval  ED Precautions  Tylenol and EtOH abstinences          Relevant Orders    US Abdomen Limited    Comprehensive Metabolic Panel     Other Visit Diagnoses       Immunization due        Relevant Medications    pneumoc 20-erwin conj-dip cr(PF) (PREVNAR-20 (PF)) injection Syrg 0.5 mL (Completed)    Cervical cancer screening        Relevant Orders    Ambulatory referral/consult to Gynecology            Future Appointments   Date Time Provider Department Center   11/27/2024 12:40 PM Leyda Sanders FNP [facility]TMED [place]   12/3/2024 12:45 PM Leo [facility]aney MD [facility]   12/10/2024  9:30 AM Maricel Weems, ANP [facility] NEURO [place] [facility]   12/31/2024  1:00 PM RESIDENT 1, [facility] OTORHINOLARYNGOLOGY [facility] ENT [place] [facility]            Signature:     [facility]  [address] [address]    Date of encounter: 8/27/24

## 2024-08-27 NOTE — ASSESSMENT & PLAN NOTE
A1C at Goal  Continue Metformin 500 mg BID  6 month f/u with labs via virtual   Follow ADA diet.  Avoid soda, simple sweets, and limit rice/pasta/bread/starches and consume brown options when possible.   Maintain healthy weight with BMI goal <30.   Perform aerobic exercise for 150 minutes per week (or 5 days a week for 30 minutes each day).   Examine feet daily.     Lab Results   Component Value Date    HGBA1C 7.1 (H) 08/14/2024

## 2024-10-07 ENCOUNTER — HOSPITAL ENCOUNTER (OUTPATIENT)
Dept: RADIOLOGY | Facility: HOSPITAL | Age: 29
Discharge: HOME | End: 2024-10-07
Attending: NURSE PRACTITIONER
Payer: MEDICAID

## 2024-10-07 ENCOUNTER — OFFICE VISIT (OUTPATIENT)
Dept: GYNECOLOGY | Facility: CLINIC | Age: 29
End: 2024-10-07
Attending: NURSE PRACTITIONER
Payer: MEDICAID

## 2024-10-07 VITALS
HEIGHT: 65 IN | OXYGEN SATURATION: 100 % | HEART RATE: 99 BPM | BODY MASS INDEX: 40.48 KG/M2 | RESPIRATION RATE: 18 BRPM | WEIGHT: 243 LBS | SYSTOLIC BLOOD PRESSURE: 117 MMHG | DIASTOLIC BLOOD PRESSURE: 84 MMHG | TEMPERATURE: 99 F

## 2024-10-07 DIAGNOSIS — Z12.4 CERVICAL CANCER SCREENING: ICD-10-CM

## 2024-10-07 DIAGNOSIS — R79.89 ELEVATED LFTS: ICD-10-CM

## 2024-10-07 DIAGNOSIS — Z11.3 SCREENING FOR STD (SEXUALLY TRANSMITTED DISEASE): ICD-10-CM

## 2024-10-07 DIAGNOSIS — Z12.4 ENCOUNTER FOR PAPANICOLAOU SMEAR FOR CERVICAL CANCER SCREENING: Primary | ICD-10-CM

## 2024-10-07 LAB
C TRACH DNA SPEC QL NAA+PROBE: NOT DETECTED
CLUE CELLS VAG QL WET PREP: ABNORMAL
N GONORRHOEA DNA SPEC QL NAA+PROBE: NOT DETECTED
SOURCE (OHS): NORMAL
T VAGINALIS VAG QL WET PREP: ABNORMAL
WBC #/AREA VAG WET PREP: ABNORMAL
YEAST SPEC QL WET PREP: ABNORMAL

## 2024-10-07 PROCEDURE — 3051F HG A1C>EQUAL 7.0%<8.0%: CPT | Mod: CPTII,,,

## 2024-10-07 PROCEDURE — 88174 CYTOPATH C/V AUTO IN FLUID: CPT

## 2024-10-07 PROCEDURE — 3008F BODY MASS INDEX DOCD: CPT | Mod: CPTII,,,

## 2024-10-07 PROCEDURE — 99214 OFFICE O/P EST MOD 30 MIN: CPT | Mod: PBBFAC,25

## 2024-10-07 PROCEDURE — 87624 HPV HI-RISK TYP POOLED RSLT: CPT

## 2024-10-07 PROCEDURE — 3061F NEG MICROALBUMINURIA REV: CPT | Mod: CPTII,,,

## 2024-10-07 PROCEDURE — 99385 PREV VISIT NEW AGE 18-39: CPT | Mod: S$PBB,,,

## 2024-10-07 PROCEDURE — 1159F MED LIST DOCD IN RCRD: CPT | Mod: CPTII,,,

## 2024-10-07 PROCEDURE — 3079F DIAST BP 80-89 MM HG: CPT | Mod: CPTII,,,

## 2024-10-07 PROCEDURE — 3074F SYST BP LT 130 MM HG: CPT | Mod: CPTII,,,

## 2024-10-07 PROCEDURE — 76705 ECHO EXAM OF ABDOMEN: CPT | Mod: TC

## 2024-10-07 PROCEDURE — 87210 SMEAR WET MOUNT SALINE/INK: CPT

## 2024-10-07 PROCEDURE — 87491 CHLMYD TRACH DNA AMP PROBE: CPT

## 2024-10-07 PROCEDURE — 3066F NEPHROPATHY DOC TX: CPT | Mod: CPTII,,,

## 2024-10-07 NOTE — PROGRESS NOTES
[facility] -  Gynecology / Women's Health Clinic     Subjective:      Patient ID: Cassandra Escobedo is a 29 y.o. female.    Chief Complaint:  Gynecologic Exam      History of Present Illness:  The patient  here for annual exam. Last GYN exam 6 yrs ago. Her LMP was 9/10/24. Period last 6 days and changes pads every 3 hours on 1 heaviest day, cycles monthly and manageable. Denies history of abnormal paps. Last pap . Denies breast or urinary complaints. Denies pelvic pain, abnormal bleeding or discharge. Pt reports no STIs in the past and desires testing. HPV vaccinated. Declines contraception. Admits never took contraception, family hx of developing ovarian cysts d/t birth control. Denies tobacco use. Dep. screening 0. Denies fly hx of ovarian, uterine or colon cancer. Paternal grandmother with breast cancer.     GYN & OB History:  Patient's last menstrual period was 09/10/2024 (approximate).   Date of Last Pap: 6/15/2017    OB History    Para Term  AB Living   1 1   1       SAB IAB Ectopic Multiple Live Births                  # Outcome Date GA Lbr Alden/2nd Weight Sex Type Anes PTL Lv   1       CS-LTranv          Past Medical History:   Diagnosis Date    Hypertension     Migraine headache         Past Surgical History:   Procedure Laterality Date     SECTION  2018     SECTION      cycst removal from left wrist      elbow Right     ELBOW SURGERY Right         Social History     Tobacco Use    Smoking status: Former     Current packs/day: 0.00     Average packs/day: 0.3 packs/day for 3.3 years (0.8 ttl pk-yrs)     Types: Cigarettes     Start date:      Quit date: 2016     Years since quittin.4     Passive exposure: Past    Smokeless tobacco: Never   Substance and Sexual Activity    Alcohol use: Not Currently     Comment: rarely    Drug use: Yes     Types: Marijuana     Comment: as needed    Sexual activity: Not Currently     Birth  "control/protection: None        Current Outpatient Medications   Medication Instructions    cetirizine (ZYRTEC) 10 MG tablet   See Instructions, TAKE ONE TABLET BY MOUTH EVERY DAY, # 30 tab(s), 1 Refill(s), Pharmacy: [facility], 163, cm, Height/Length Dosing, 07/05/22 11:37:00 CDT, 113.1, kg, Weight Dosing, 07/05/22 11:37:00 CDT    DULoxetine (CYMBALTA) 30 MG capsule Take 30mg+ 60mg by mouth daily.    DULoxetine (CYMBALTA) 60 MG capsule Take 30mg+ 60mg by mouth daily.    metFORMIN (GLUCOPHAGE) 500 mg, Oral, 2 times daily with meals, For Diabetes    propranoloL (INDERAL) 80 mg, Oral, 2 times daily    rizatriptan (MAXALT) 10 mg, Oral, 2 times daily PRN    topiramate (TOPAMAX) 25 mg, Oral, Nightly       Review of patient's allergies indicates:   Allergen Reactions    Phenergan plain     Promethazine     Sulfa (sulfonamide antibiotics)          Review of Systems:  Review of Systems  Negative except for pertinent findings for positives per HPI.     Objective:     Physical Exam   Visit Vitals  /84 (Patient Position: Sitting)   Pulse 99   Temp 98.7 °F (37.1 °C) (Oral)   Resp 18   Ht 5' 5" (1.651 m)   Wt 110.2 kg (243 lb)   LMP 09/10/2024 (Approximate)   SpO2 100%   BMI 40.44 kg/m²       GENERAL: Well-developed female. No acute distress.    SKIN: Normal to inspection, warm and intact.  BREASTS: No rashes or erythema. No masses, lumps, discharge, tenderness.  VULVA: General appearance normal; external genitalia with no lesions or erythema.  VAGINA: Mucosa/vaginal vault pink, no abnormal discharge or lesions.  CERVIX: Pink, nulliparous appearing os, no erythema or abnormal discharge.  BIMANUAL EXAM: limited d/t body habitus. The uterus is non tender. Bilateral adnexa reveal no tenderness.  PSYCHIATRIC: Patient is oriented to person, place, and time. Mood and affect are normal.    Assessment:       ICD-10-CM ICD-9-CM   1. Encounter for Papanicolaou smear for cervical cancer screening  Z12.4 V76.2   2. Cervical cancer " screening  Z12.4 V76.2   3. Screening for STD (sexually transmitted disease)  Z11.3 V74.5       Plan:     1. Encounter for Papanicolaou smear for cervical cancer screening  -     Liquid-Based Pap Smear, Screening    2. Cervical cancer screening  -     Ambulatory referral/consult to Gynecology    3. Screening for STD (sexually transmitted disease)  -     Chlamydia/GC, PCR  -     Wet Prep, Genital    Pap today  STD testing    Call with any GYN concerns  Follow up in about 1 year (around 10/7/2025) for Annual.

## 2024-10-08 ENCOUNTER — PATIENT MESSAGE (OUTPATIENT)
Dept: INTERNAL MEDICINE | Facility: CLINIC | Age: 29
End: 2024-10-08
Payer: MEDICAID

## 2024-10-10 LAB
HIGH RISK HPV: NEGATIVE
PSYCHE PATHOLOGY RESULT: NORMAL

## 2024-11-27 ENCOUNTER — LAB VISIT (OUTPATIENT)
Dept: LAB | Facility: HOSPITAL | Age: 29
End: 2024-11-27
Attending: NURSE PRACTITIONER
Payer: MEDICAID

## 2024-11-27 ENCOUNTER — OFFICE VISIT (OUTPATIENT)
Dept: INTERNAL MEDICINE | Facility: CLINIC | Age: 29
End: 2024-11-27
Payer: MEDICAID

## 2024-11-27 DIAGNOSIS — R79.89 ELEVATED LFTS: Primary | ICD-10-CM

## 2024-11-27 DIAGNOSIS — Z00.00 WELLNESS EXAMINATION: ICD-10-CM

## 2024-11-27 DIAGNOSIS — E11.9 TYPE 2 DIABETES MELLITUS WITHOUT COMPLICATION, WITHOUT LONG-TERM CURRENT USE OF INSULIN: ICD-10-CM

## 2024-11-27 DIAGNOSIS — R79.89 ELEVATED LFTS: ICD-10-CM

## 2024-11-27 LAB
ALBUMIN SERPL-MCNC: 4.4 G/DL (ref 3.4–5)
ALBUMIN/GLOB SERPL: 1.5 RATIO
ALP SERPL-CCNC: 89 UNIT/L (ref 50–144)
ALT SERPL-CCNC: 305 UNIT/L (ref 1–45)
ANION GAP SERPL CALC-SCNC: 8 MEQ/L (ref 2–13)
AST SERPL-CCNC: 128 UNIT/L (ref 14–36)
BILIRUB SERPL-MCNC: 0.5 MG/DL (ref 0–1)
BUN SERPL-MCNC: 14 MG/DL (ref 7–20)
CALCIUM SERPL-MCNC: 10.1 MG/DL (ref 8.4–10.2)
CHLORIDE SERPL-SCNC: 106 MMOL/L (ref 98–110)
CO2 SERPL-SCNC: 25 MMOL/L (ref 21–32)
CREAT SERPL-MCNC: 0.51 MG/DL (ref 0.66–1.25)
CREAT/UREA NIT SERPL: 27 (ref 12–20)
GFR SERPLBLD CREATININE-BSD FMLA CKD-EPI: >90 ML/MIN/1.73/M2
GLOBULIN SER-MCNC: 2.9 GM/DL (ref 2–3.9)
GLUCOSE SERPL-MCNC: 190 MG/DL (ref 70–115)
HAV IGM SERPL QL IA: NONREACTIVE
HBV CORE IGM SERPL QL IA: NONREACTIVE
HBV SURFACE AG SERPL QL IA: NONREACTIVE
HCV AB SERPL QL IA: NONREACTIVE
POTASSIUM SERPL-SCNC: 4.2 MMOL/L (ref 3.5–5.1)
PROT SERPL-MCNC: 7.3 GM/DL (ref 6.3–8.2)
SODIUM SERPL-SCNC: 139 MMOL/L (ref 136–145)

## 2024-11-27 PROCEDURE — 1160F RVW MEDS BY RX/DR IN RCRD: CPT | Mod: CPTII,95,, | Performed by: NURSE PRACTITIONER

## 2024-11-27 PROCEDURE — 2023F DILAT RTA XM W/O RTNOPTHY: CPT | Mod: CPTII,95,, | Performed by: NURSE PRACTITIONER

## 2024-11-27 PROCEDURE — 80053 COMPREHEN METABOLIC PANEL: CPT

## 2024-11-27 PROCEDURE — 3066F NEPHROPATHY DOC TX: CPT | Mod: CPTII,95,, | Performed by: NURSE PRACTITIONER

## 2024-11-27 PROCEDURE — 1159F MED LIST DOCD IN RCRD: CPT | Mod: CPTII,95,, | Performed by: NURSE PRACTITIONER

## 2024-11-27 PROCEDURE — 3061F NEG MICROALBUMINURIA REV: CPT | Mod: CPTII,95,, | Performed by: NURSE PRACTITIONER

## 2024-11-27 PROCEDURE — 36415 COLL VENOUS BLD VENIPUNCTURE: CPT

## 2024-11-27 PROCEDURE — 99214 OFFICE O/P EST MOD 30 MIN: CPT | Mod: 95,,, | Performed by: NURSE PRACTITIONER

## 2024-11-27 PROCEDURE — 3051F HG A1C>EQUAL 7.0%<8.0%: CPT | Mod: CPTII,95,, | Performed by: NURSE PRACTITIONER

## 2024-11-27 PROCEDURE — 80074 ACUTE HEPATITIS PANEL: CPT | Performed by: NURSE PRACTITIONER

## 2024-11-27 NOTE — PROGRESS NOTES
GRACY Ambrosio   [facility]  [address] [facility] [address]      PATIENT NAME: Cassandra Escobedo  : 1995  DATE: 24  MRN: [id number]      Patient PCP Information       Provider PCP Type    GRACY Ambrosio General            Reason for Visit / Chief Complaint: No chief complaint on file.       History of Present Illness / Problem Focused Workflow     Cassandra Escobedo presents to the clinic with No chief complaint on file.     30 yo WF here today for f/u. Medical problems include T2DM, Migraines & anxiety. Followed by [facility] and Dr. Chaney.     Cervical Cancer Screening: [facility] F/U   Osteoporosis Screenin23 Vitamin D Level   Diabetic Screening: [facility]   HCV Screening: 10/03/22    2023  Pt here today for yearly wellness OV, will complete labs after today's visit. Pt reports compliance with all mediations, metformin refilled x 1 month for pt today. Aware of routine scheduled speciatly appointments. Will f/u in1 month via virtual for lab review and med refills. Denies any acute issues today. Agreeable to Fundal exam today.   Denies chest pain, shortness of breath, cough, fever, headache, dizziness, weakness, abdominal pain, nausea, vomiting, diarrhea, constipation, black/bloody stools, unplanned weight loss, night sweats, changes in urinary patterns, burning/odor with urination, depression, anxiety, and SI/HI.     2024  Pt here today for f/u via virtual for lab review. Labs reviewed and discussed with no questions or concerns at this time. A1C at goal of 6.2. Will continue current medication regiment at this time. Meds reviewed and refilled appropriately. We will f/u in 6 months with routine labs via virtual.   Denies chest pain, shortness of breath, cough, fever, headache, dizziness, weakness, abdominal pain, nausea, vomiting, diarrhea, constipation, black/bloody stools, unplanned  weight loss, night sweats, changes in urinary patterns, burning/odor with urination, depression, anxiety, and SI/HI.     08/27/2024  Pt here today for T2DM f/u with labs completed; labs reviewed discussed with no questions or concerns regarding diabetes, A1c near goal, patient reports compliance with medications.  Discussed with patient elevated FLP as well as elevated liver function tests.  Patient denies any alcohol as of recently or excessive use of Tylenol.  She is agreeable to ultrasound of the liver for evaluation and also repeat CMP in about 3 months.  We will hold off on initiating statin therapy due to liver function tests at this time.  Health maintenance topics reviewed, patient agreeable to referral for Pap smear.  Denies any other acute issues today.    11/27/2024  Pt here today via virtual for f/u for elevated LFT's; pt LFTs have about doubled since the last eval. US completed last month shows hepatic steatosis and hepatomegaly. Pt still continues to deny any ETOH or Tylenol use at this time. Agreeable to referral to [facility] for further eval.   Will f/u with pt in about 3 months for routine wellness with labs and prn. Meds reviewed and are UTD. HM topics reviewed and are UTD. Pt denies any acute issues today.               Review of Systems     Review of Systems   Constitutional:  Negative for activity change and unexpected weight change.   HENT:  Negative for hearing loss, rhinorrhea and trouble swallowing.    Eyes:  Negative for discharge and visual disturbance.   Respiratory:  Negative for chest tightness and wheezing.    Cardiovascular:  Negative for chest pain and palpitations.   Gastrointestinal:  Negative for blood in stool, constipation, diarrhea and vomiting.   Endocrine: Negative for polydipsia and polyuria.   Genitourinary:  Negative for difficulty urinating, dysuria, hematuria and menstrual problem.   Musculoskeletal:  Negative for arthralgias, joint swelling and neck pain.    Neurological:  Negative for weakness and headaches.   Psychiatric/Behavioral:  Negative for confusion and dysphoric mood.          Medications and Allergies     Medications  Current Outpatient Medications   Medication Instructions    cetirizine (ZYRTEC) 10 MG tablet   See Instructions, TAKE ONE TABLET BY MOUTH EVERY DAY, # 30 tab(s), 1 Refill(s), Pharmacy: [facility], 163, cm, Height/Length Dosing, 07/05/22 11:37:00 CDT, 113.1, kg, Weight Dosing, 07/05/22 11:37:00 CDT    DULoxetine (CYMBALTA) 30 MG capsule Take 30mg+ 60mg by mouth daily.    DULoxetine (CYMBALTA) 60 MG capsule Take 30mg+ 60mg by mouth daily.    metFORMIN (GLUCOPHAGE) 500 mg, Oral, 2 times daily with meals, For Diabetes    propranoloL (INDERAL) 80 mg, Oral, 2 times daily    rizatriptan (MAXALT) 10 mg, Oral, 2 times daily PRN    topiramate (TOPAMAX) 25 mg, Oral, Nightly         Allergies  Review of patient's allergies indicates:   Allergen Reactions    Phenergan plain     Promethazine     Sulfa (sulfonamide antibiotics)        Physical Examination     There were no vitals taken for this visit.    Physical Exam      Results     Lab Results   Component Value Date    WBC 7.02 08/14/2024    RBC 4.82 08/14/2024    HGB 12.8 08/14/2024    HCT 38.9 08/14/2024    MCV 80.7 08/14/2024    MCH 26.6 (L) 08/14/2024    MCHC 32.9 08/14/2024    RDW 14.1 08/14/2024     08/14/2024    MPV 11.0 08/14/2024     CMP  Sodium   Date Value Ref Range Status   11/27/2024 139 136 - 145 mmol/L Final     Potassium   Date Value Ref Range Status   11/27/2024 4.2 3.5 - 5.1 mmol/L Final     Chloride   Date Value Ref Range Status   11/27/2024 106 98 - 110 mmol/L Final     CO2   Date Value Ref Range Status   11/27/2024 25 21 - 32 mmol/L Final     Blood Urea Nitrogen   Date Value Ref Range Status   11/27/2024 14 7.0 - 20.0 mg/dL Final     Creatinine   Date Value Ref Range Status   11/27/2024 0.51 (L) 0.66 - 1.25 mg/dL Final     Calcium   Date Value Ref Range Status   11/27/2024 10.1  8.4 - 10.2 mg/dL Final     Albumin   Date Value Ref Range Status   11/27/2024 4.4 3.4 - 5.0 g/dL Final     Bilirubin Total   Date Value Ref Range Status   11/27/2024 0.5 0.0 - 1.0 mg/dL Final     ALP   Date Value Ref Range Status   11/27/2024 89 50 - 144 unit/L Final     AST   Date Value Ref Range Status   11/27/2024 128 (H) 14 - 36 unit/L Final     ALT   Date Value Ref Range Status   11/27/2024 305 (H) 1 - 45 unit/L Final     eGFR   Date Value Ref Range Status   09/15/2021 159 mL/min/1.73 m2 Final     Lab Results   Component Value Date    CHOL 235 (H) 08/14/2024     Lab Results   Component Value Date    HDL 48 08/14/2024     Lab Results   Component Value Date    TRIG 244 (H) 08/14/2024     Lab Results   Component Value Date    VLDL 44 12/21/2023     Lab Results   Component Value Date    .00 12/21/2023     Lab Results   Component Value Date    TSH 0.701 12/21/2023         Assessment      No diagnosis found.     Plan      Problem List Items Addressed This Visit    None      Future Appointments   Date Time Provider Department Center   11/27/2024 12:40 PM Leyda Sanders FNP [facility]   12/3/2024 12:45 PM Leo Chaney MD [facility]   12/10/2024  9:30 AM Maricel Weems ANP [facility] NEURO [facility]   12/31/2024  1:00 PM RESIDENT 1, [facility] OTORHINOLARYNGOLOGY [facility] ENT [facility]   10/8/2025  1:30 PM Amie Bethea FNP [facility] GYN [facility]        No follow-ups on file.      Signature:     [facility]  [address] [address]    Date of encounter: 11/27/24    The patient location is: home  The chief complaint leading to consultation is: Lab Review    Visit type: audiovisual    Face to Face time with patient: 15  30 minutes of total time spent on the encounter, which includes face to face time and non-face to face time preparing to see the patient (eg, review of tests), Obtaining and/or reviewing separately  obtained history, Documenting clinical information in the electronic or other health record, Independently interpreting results (not separately reported) and communicating results to the patient/family/caregiver, or Care coordination (not separately reported).         Each patient to whom he or she provides medical services by telemedicine is:  (1) informed of the relationship between the physician and patient and the respective role of any other health care provider with respect to management of the patient; and (2) notified that he or she may decline to receive medical services by telemedicine and may withdraw from such care at any time.    Notes:   '     Weakness

## 2024-11-27 NOTE — ASSESSMENT & PLAN NOTE
Referral to [facility]  Repeat Hepatitis Panel - f/u with results   US ABD limited on file   Repeat CMP in 2 months   ED Precautions  Tylenol and EtOH abstinences

## 2024-12-02 ENCOUNTER — PATIENT MESSAGE (OUTPATIENT)
Dept: INTERNAL MEDICINE | Facility: CLINIC | Age: 29
End: 2024-12-02
Payer: MEDICAID

## 2024-12-03 ENCOUNTER — OFFICE VISIT (OUTPATIENT)
Dept: BEHAVIORAL HEALTH | Facility: CLINIC | Age: 29
End: 2024-12-03
Payer: MEDICAID

## 2024-12-03 DIAGNOSIS — F41.1 GAD (GENERALIZED ANXIETY DISORDER): ICD-10-CM

## 2024-12-03 DIAGNOSIS — F33.1 MODERATE EPISODE OF RECURRENT MAJOR DEPRESSIVE DISORDER: Primary | ICD-10-CM

## 2024-12-03 DIAGNOSIS — G47.00 INSOMNIA, UNSPECIFIED TYPE: ICD-10-CM

## 2024-12-03 PROCEDURE — 99214 OFFICE O/P EST MOD 30 MIN: CPT | Mod: 95,,, | Performed by: STUDENT IN AN ORGANIZED HEALTH CARE EDUCATION/TRAINING PROGRAM

## 2024-12-03 PROCEDURE — 3066F NEPHROPATHY DOC TX: CPT | Mod: CPTII,95,, | Performed by: STUDENT IN AN ORGANIZED HEALTH CARE EDUCATION/TRAINING PROGRAM

## 2024-12-03 PROCEDURE — 3061F NEG MICROALBUMINURIA REV: CPT | Mod: CPTII,95,, | Performed by: STUDENT IN AN ORGANIZED HEALTH CARE EDUCATION/TRAINING PROGRAM

## 2024-12-03 PROCEDURE — 1160F RVW MEDS BY RX/DR IN RCRD: CPT | Mod: CPTII,95,, | Performed by: STUDENT IN AN ORGANIZED HEALTH CARE EDUCATION/TRAINING PROGRAM

## 2024-12-03 PROCEDURE — 3051F HG A1C>EQUAL 7.0%<8.0%: CPT | Mod: CPTII,95,, | Performed by: STUDENT IN AN ORGANIZED HEALTH CARE EDUCATION/TRAINING PROGRAM

## 2024-12-03 PROCEDURE — 1159F MED LIST DOCD IN RCRD: CPT | Mod: CPTII,95,, | Performed by: STUDENT IN AN ORGANIZED HEALTH CARE EDUCATION/TRAINING PROGRAM

## 2024-12-03 RX ORDER — DULOXETIN HYDROCHLORIDE 30 MG/1
CAPSULE, DELAYED RELEASE ORAL
Qty: 90 CAPSULE | Refills: 3 | Status: SHIPPED | OUTPATIENT
Start: 2024-12-03

## 2024-12-03 RX ORDER — DULOXETIN HYDROCHLORIDE 60 MG/1
CAPSULE, DELAYED RELEASE ORAL
Qty: 90 CAPSULE | Refills: 3 | Status: SHIPPED | OUTPATIENT
Start: 2024-12-03

## 2024-12-03 RX ORDER — TRAZODONE HYDROCHLORIDE 50 MG/1
TABLET ORAL
Qty: 60 TABLET | Refills: 2 | Status: SHIPPED | OUTPATIENT
Start: 2024-12-03

## 2024-12-03 NOTE — PROGRESS NOTES
"Outpatient Psychiatry Follow-Up Visit    12/3/2024    Clinical Status of Patient:  Outpatient (Ambulatory)    Chief Complaint:  Cassandra Escobedo is a 29 y.o. female who presents today for follow-up of depression and anxiety. Patient last seen for follow-up on 5/29/2024. Met with patient.      TELE PSYCHIATRY Disclaimer   *The patient was informed despite using HIPPA compliant technology there may be risks including security breach, technological failure, inability to perform a comprehensive physical exam which could delay or prevent an accurate diagnosis, and potential complications from treatment decisions rendered over a telemedical platform.   The patient was also informed of the relationship between the physician and patient and the respective role of any other health care provider with respect to management of the patient; and notified that the pt may decline to receive medical services by telemedicine and may withdraw from such care at any time.     Patient's Current location: [address] [place] [address]   In Case of Emergency pts next of kin  Name:Latonia Escobedo  Phone number:  [phone]   Visit type: Virtual visit with synchronous audio and video  Total time spent with patient: 24 minutes    Interval History and Content of Current Session:  Interim Events/Subjective Report/Content of Current Session:   Pt reports doing "good"  overall.  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  Feels she is able to keep up with her workload.  Reports "good" mood,  "not as bad as it used to be" anxiety.  Sleep fair, occasionally has nights in which she doesn't sleep. Appetite decreased, weight decreased (intentional).  Energy "ok", motivation fair.  Occasional irritability, denies hopelessness.  Denies SI/HI/AVH/paranoia, denies plan or desire for self harm or harm to others.  Reports SE from current regimen: decreased appetite from diabetic medications.. Denies change in chronic somatic " "complaints. Pt voices desire to adjust regimen to address ongoing symptoms.      Psychiatric Review of Systems-is patient experiencing or having changes in  See HPI above.     Review of Systems   PSYCHIATRIC: Pertinant items are noted in the narrative.  CONSTITUTIONAL: No weight gain or loss.  +fatigue  MUSCULOSKELETAL: No pain or stiffness of the joints.  NEUROLOGIC: No weakness, sensory changes, seizures, confusion, memory loss, tremor or other abnormal movements.  CARDIAC: No CP, no palpitations  RESPIRATORY: No shortness of breath.  CARDIOVASCULAR: No tachycardia or chest pain.  GASTROINTESTINAL: No nausea, vomiting, pain, constipation or diarrhea.    Past Medical, Family and Social History: The patient's past medical, family and social history have been reviewed and updated as appropriate within the electronic medical record - see encounter notes.    Compliance: good    Side effects: decreased appetite from diabetic medication (metformin likely)    Risk Parameters:  Patient reports no suicidal ideation  Patient reports no homicidal ideation  Patient reports no self-injurious behavior  Patient reports no violent behavior    Exam (detailed: at least 9 elements; comprehensive: all 15 elements)   Constitutional  Vitals:  Most recent vital signs, dated less than 90 days prior to this appointment, were reviewed.     There were no vitals filed for this visit.       General:   Constitutional: No acute distress, appears stated age, casually dressed    Neurologic:   Motor: moves all extremities spontaneously and without difficulty, no abnormal involuntary movements observed  Gait: normal gait and station    Mental status examination:   Appearance: appears stated age, casually dressed, no acute distress  Behavior: unremarkable for situation, calm and cooperative  Mood: "ok"  Affect: mood congruent and euthymic  Thought process: linear and goal directed  Associations: appropriate for conversation  Thought content: no plan " or desire for self harm or harm to others, denies paranoia, no delusional ideation volunteered  Perceptions: denies hallucinations or other altered perceptions  Orientation: oriented to day of week, month, year, location, and situation  Language: English, fluid  Attention: able to attend to interview  Insight: good  Judgement: good    PHQ9:  Over the last two weeks how often have you been bothered by little interest or pleasure in doing things: 0  Over the last two weeks how often have you been bothered by feeling down, depressed or hopeless: 0  PHQ-2 Total Score: 0    GAD7:      3/30/2023    11:34 AM 11/30/2022     9:51 AM   GAD7   1. Feeling nervous, anxious, or on edge? 1 3   2. Not being able to stop or control worrying? 1 3   3. Worrying too much about different things? 1 3   4. Trouble relaxing? 0 3   5. Being so restless that it is hard to sit still? 0 3   6. Becoming easily annoyed or irritable? 0 3   7. Feeling afraid as if something awful might happen? 0 2   8. If you checked off any problems, how difficult have these problems made it for you to do your work, take care of things at home, or get along with other people? 0 2   YOSEF-7 Score 3 20     Assessment and Diagnosis   Status/Progress: Based on the examination today, the patient's problem(s) is/are well controlled.  New problems have not been presented today.   Co-morbidities and Lack of compliance are not complicating management of the primary condition.  Number of separate conditions addressed during today's visit: 2 (mood well controlled, sleep uncontrolled).  Medication management: yes: Starting a medication, Refilling a prescription, and Deciding to continue a pre-existing prescription.      Are referral(s) being ordered today: No.  Complexity (level) of medical decision making employed in the encounter: MODERATE.    General Impression:    ICD-10-CM ICD-9-CM   1. Moderate episode of recurrent major depressive disorder  F33.1 296.32   2. YOSEF  (generalized anxiety disorder)  F41.1 300.02   3. Insomnia, unspecified type  G47.00 780.52     Intervention/Counseling/Treatment Plan   Continue cymbalta 90mg daily  Start trazodone 50-100mg nightly prn insomnia, discussed potential SE including but not limited to sedation, suicidal thinking/behavior, dry mouth  No need for PEC as pt is not an imminent danger to self or others or gravely disabled due to acute psychiatric illness  Discussed that pt should either call clinic for psychiatric crisis symptoms or present to nearest emergency room    Discussed with patient informed consent including diagnosis, risks and benefits of proposed treatment above vs. alternative treatments vs. no treatment, as well as serious and common side effects of these treatments, and the inherent unpredictability of individual responses to these treatments. The patient expresses understanding of the above and displays the capacity to agree with this current plan. Patient also agrees that, currently, the benefits outweigh the risks and would like to pursue treatment at this time, and had no other questions.    Instructions:  Take all medications as prescribed.    Abstain from recreational drugs and alcohol.  Present to ED or call 911 for SI/HI plan or intent, psychosis, or medical emergency.    Return to Clinic: Follow up in about 3 months (around 3/3/2025).    Total time:     The total time for services performed on the date of the encounter: 15 minutes    Leo Chaney MD  [facility]

## 2024-12-10 ENCOUNTER — PATIENT MESSAGE (OUTPATIENT)
Dept: NEUROLOGY | Facility: CLINIC | Age: 29
End: 2024-12-10

## 2024-12-10 ENCOUNTER — OFFICE VISIT (OUTPATIENT)
Dept: NEUROLOGY | Facility: CLINIC | Age: 29
End: 2024-12-10
Payer: MEDICAID

## 2024-12-10 DIAGNOSIS — E66.813 CLASS 3 SEVERE OBESITY WITH SERIOUS COMORBIDITY AND BODY MASS INDEX (BMI) OF 40.0 TO 44.9 IN ADULT, UNSPECIFIED OBESITY TYPE: ICD-10-CM

## 2024-12-10 DIAGNOSIS — E66.01 CLASS 3 SEVERE OBESITY WITH SERIOUS COMORBIDITY AND BODY MASS INDEX (BMI) OF 40.0 TO 44.9 IN ADULT, UNSPECIFIED OBESITY TYPE: ICD-10-CM

## 2024-12-10 DIAGNOSIS — G47.00 INSOMNIA, UNSPECIFIED TYPE: ICD-10-CM

## 2024-12-10 DIAGNOSIS — G43.001 MIGRAINE WITHOUT AURA AND WITH STATUS MIGRAINOSUS, NOT INTRACTABLE: Primary | ICD-10-CM

## 2024-12-10 PROCEDURE — 3066F NEPHROPATHY DOC TX: CPT | Mod: CPTII,95,, | Performed by: NURSE PRACTITIONER

## 2024-12-10 PROCEDURE — 3051F HG A1C>EQUAL 7.0%<8.0%: CPT | Mod: CPTII,95,, | Performed by: NURSE PRACTITIONER

## 2024-12-10 PROCEDURE — 3061F NEG MICROALBUMINURIA REV: CPT | Mod: CPTII,95,, | Performed by: NURSE PRACTITIONER

## 2024-12-10 PROCEDURE — 1160F RVW MEDS BY RX/DR IN RCRD: CPT | Mod: CPTII,95,, | Performed by: NURSE PRACTITIONER

## 2024-12-10 PROCEDURE — 1159F MED LIST DOCD IN RCRD: CPT | Mod: CPTII,95,, | Performed by: NURSE PRACTITIONER

## 2024-12-10 PROCEDURE — 99214 OFFICE O/P EST MOD 30 MIN: CPT | Mod: 95,,, | Performed by: NURSE PRACTITIONER

## 2024-12-10 NOTE — PROGRESS NOTES
"[facility] Neurology Telemedicine Follow Up Visit Note    Initial Visit Date: 8/18/2023  Last Visit Date: 6/10/2024  Current Visit Date:  12/10/2024  This is a real-time audio/video visit that was performed with the originating site at patient's home and the distant site, [facility]. Verbal consent to participate in interactive audio & video visit was obtained.    I discussed with the patient regarding the nature of our telehealth visits, that:    - Our sessions are not being recorded and that personal health information is protected  - Provider would evaluate the patient and recommend diagnostics and treatments based on my assessment  - [facility] will provide follow up care in person if/when the patient needs it.     Chief Complaint:     Chief Complaint   Patient presents with    Migraine     Patient states "a bit better", however notices flare ups with weather changes.       History of Present Illness:      This is 29 y.o. female with history of anxiety, type 2 diabetes, allergic rhinitis who was referred for episodic migraine without aura. During last visit, propranolol 80 mg BID, TPM 25 mg daily, duloxetine 90 mg daily and Rizatriptan 10 mg BID were continued.    Today,  Pt notes increase migraines due to weather. Had two last week. TPM 25 mg PRN, propranolol 80 mg BID and duloxetine 90 mg daily. Rizatriptan effective as abortive therapy, does not require second dose. Followed by mental health for depression and finds therapy helpful. States she is walking daily, weather permitting. Sleeping well.    Age of Onset : 9 years old      Headache Description:   right frontotemporal, pounding, severe, impeding day to day activity, lasting up to 7 days, with nausea and photophobia  Bifrontal, dull, moderate, not impeding day to day activity, constant with nausea      Frequency: 3 migraine headache days per month; 1-2 monthly " now    Provocation Factors: not on propranolol      Risk Factors  - Family history of headache disorder: Yes maternal grandmother and mother with headache.  - History of focal CNS lesions: No  - History of CNS infections: No  - History head trauma: No  - History of underlying mood disorder: Yes anxiety disorder.  Follows with Dr. Chaney.  - History of sleep disorder: Yes not well  - Recreational drug use: Yes marijuana  - Tobacco use: Yes occasional  - Alcohol use: Yes occasional  - Weight fluctuation: Yes dropped 10 lbs for the past 1 month.  - Isotretinoin or Tetracycline use:  No  - Family planning and contraceptive use: Yes not sexually active. However, does plan on becoming pregnant when she becomes sexually active in the future.      Medications:     Current Prophylactic  Duloxetine 90 mg daily (11/30/2022 to present)  Propanolol 80 mg twice a day (8/18/2023 to present): effective   Topiramate 25 mg daily (8/21/2023 to present): taking PRN Effective. Sedating;.     Current Abortive  Ibuprofen 800 mg TID PRN: ineffective   Rizatriptan 10 mg twice a day as needed (8/18/2023 to present): effective.     Prior Prophylactic  Topiramate 50 mg daily (8/18/2023 - 8/21/2023): sedation     Prior Abortive  Denied     Devices:     - VNS:  - TNS  - TMS:     Procedures:     - Botox:  - PSG block:   - Occipital nerve block:     Labs:     Results for orders placed or performed in visit on 11/27/24   Hepatitis Panel, Acute    Collection Time: 11/27/24 10:41 AM   Result Value Ref Range    Hep A IgM Interp Nonreactive Nonreactive    Hep B Core IgM Interp Nonreactive Nonreactive    Hep BsAg Interp Nonreactive Nonreactive    Hep C Ab Interp Nonreactive Nonreactive       Studies:     - MRI Brain w/out contrast 10/17/2022: I have reviewed the study independently and with the patient.  Unremarkable.   - MRA Head w/o Edd:   - MRV Head w/o Edd:   - NCHCT:  - Lumbar Puncture:    Review of Systems:     Review of Systems   All other systems  reviewed and are negative.  As per HPI    Physical Exams:     There were no vitals filed for this visit.    Physical Exam  Vitals and nursing note reviewed.   Constitutional:       Appearance: Normal appearance. She is obese.   HENT:      Head: Normocephalic and atraumatic.      Right Ear: External ear normal.      Left Ear: External ear normal.      Nose: Nose normal.      Mouth/Throat:      Mouth: Mucous membranes are moist.      Pharynx: Oropharynx is clear.   Eyes:      Conjunctiva/sclera: Conjunctivae normal.   Pulmonary:      Effort: Pulmonary effort is normal.   Abdominal:      General: There is no distension.      Tenderness: There is no guarding.   Musculoskeletal:         General: Normal range of motion.      Cervical back: Normal range of motion.   Skin:     Coloration: Skin is not jaundiced.      Findings: No lesion or rash.   Neurological:      Mental Status: She is alert.     Comprehensive Neurological Exam:  Mental Status: Alert Oriented to Self, Date, and Place. Comprehension wnl. No dysarthria.   CN II - XII: No ptosis OU, EOMI without nystagmus, Hearing grossly intact, Face Symmetric, Tongue and Uvula midline, Trapezius symmetric bilateral.   Motor: tone and bulk wnl throughout, no abnormal involuntary or voluntary movements, no satelliting, Fine finger movements wnl b/l, No pronator drift.   Sensory: JHONY due to nature of visit  Reflexes: JHONY due to nature of visit  Cerebellar: FNF wnl b/l  Romberg: Negative  Gait: normal, bilat arm swing intact    Assessment:     This is 29 y.o. female with history of anxiety, type 2 diabetes, allergic rhinitis who was referred for episodic migraine without aura.  Average <1 migraine per week that resolve with rizatriptan. This is not a medication overuse HA. Taking TPM PRN instead of nightly. Admits to snoring, but denies AM headache or dry mouth. Has never been tested for MARJAN, does not wish to proceed at this time. Walking daily, weather permitting.    Problem  List Items Addressed This Visit          Neuro    Migraine without aura and with status migrainosus, not intractable - Primary       Endocrine    Class 3 severe obesity with serious comorbidity and body mass index (BMI) of 40.0 to 44.9 in adult       Other    Insomnia     Plan:     [] c/w Duloxetine 90 mg daily   [] c/w Propanolol 80 mg twice a day  [] c/w Topiramate 25 mg at bedtime as needed  [] c/w Rizatripan 10 mg twice a day as needed  [] handout on sleep apnea  [] call office for migraine > 24 hrs and failed abortive therapy; will call in HA cocktail  [] consider PSG in future for snoring      RTC 6 months -  ok    Headache education provided: good sleep hygiene and 7 hours of sleep per night, stress management, medication overuse education provided. Using more 3 OTC per week may worsen headaches, high intensity interval training has shown to reduce headache frequency. Low carb, high protein has shown to reduce headache frequency. Patient is instructed in keep headache diary.     I have explained the treatment plan, diagnosis, and prognosis to patient. All questions are answered to the best of my knowledge.     Face to face time 30 minutes, including documentation, chart review, counseling, education, review of test results, relevant medical records, and coordination of care.     Maricel Weems, NP-C  General Neurology  12/10/2024

## 2024-12-23 ENCOUNTER — E-VISIT (OUTPATIENT)
Dept: INTERNAL MEDICINE | Facility: CLINIC | Age: 29
End: 2024-12-23
Payer: MEDICAID

## 2024-12-23 DIAGNOSIS — E11.9 TYPE 2 DIABETES MELLITUS WITHOUT COMPLICATION, WITHOUT LONG-TERM CURRENT USE OF INSULIN: Primary | ICD-10-CM

## 2024-12-23 RX ORDER — BLOOD-GLUCOSE SENSOR
EACH MISCELLANEOUS
Qty: 3 EACH | Refills: 12 | Status: SHIPPED | OUTPATIENT
Start: 2024-12-23

## 2024-12-23 RX ORDER — BLOOD-GLUCOSE,RECEIVER,CONT
EACH MISCELLANEOUS
Qty: 1 EACH | Refills: 0 | Status: SHIPPED | OUTPATIENT
Start: 2024-12-23

## 2024-12-23 NOTE — PROGRESS NOTES
Patient ID: Cassandra Escobedo is a 29 y.o. female.    Chief Complaint: General Illness (Entered automatically based on patient selection in [facility].)    The patient initiated a request through [facility] on 12/23/2024 for evaluation and management with a chief complaint of General Illness (Entered automatically based on patient selection in [employer] [facility].)     I evaluated the questionnaire submission on 12/23/24.    [facility]    12/23/2024 10:58 AM CST - Filed by Patient   What do you need help with? Other Concern   Do you agree to participate in an E-Visit? Yes   If you have any of the following symptoms, please present to your local emergency room or call 911:  I acknowledge   Do you have any of the following pregnancy-related conditions? None   What is the main issue you would like addressed today? Sensor for diabetes   Please describe your symptoms Was wondering if i could get a sensor to help keep track of my numbers   Where is your problem located? Ummm not sure lol   How severe are your symptoms? Mild   Have you had these symptoms before? Yes   How long have you been having these symptoms? For a week   Please list any medications or treatments you have used for your condition and indicate if it was effective or not. Poking finger   What makes this feel better? Make sure my blood sugar is normal   What makes this feel worse? Eating sugar lol   Are these symptoms related to a condition that you currently have? Yes   What is the condition? Diabetes   When were you last seen for this condition?    Please describe any probable cause for these symptoms None just wondering   Provide any additional information you feel is important. Was wondering if i could get a sensor for my blood sugar to help keep track of my numbers better   Please attach any relevant images or files    Are you able to take your vital signs? No         Encounter Diagnosis   Name Primary?    Type 2 diabetes mellitus without  complication, without long-term current use of insulin Yes        Orders Placed This Encounter   Procedures    Ambulatory referral/consult to Diabetes Education     Standing Status:   Future     Standing Expiration Date:   12/23/2025     Referral Priority:   Routine     Referral Type:   Consultation     Referral Reason:   Specialty Services Required     Requested Specialty:   Diabetes     Number of Visits Requested:   1     Expiration Date:   12/23/2025      Medications Ordered This Encounter   Medications    blood-glucose meter,continuous (DEXCOM ) Misc     Sig: As Directed     Dispense:  1 each     Refill:  0    blood-glucose sensor (DEXCOM G7 SENSOR) Jenn     Sig: Apply 1 sensor every 10 days.     Dispense:  3 each     Refill:  12        No follow-ups on file.      E-Visit Time Tracking:    Day 1 Time (in minutes): 10    Total Time (in minutes): 10

## 2025-02-19 ENCOUNTER — TELEPHONE (OUTPATIENT)
Dept: INTERNAL MEDICINE | Facility: CLINIC | Age: 30
End: 2025-02-19
Payer: MEDICAID

## 2025-02-19 DIAGNOSIS — E11.65 TYPE 2 DIABETES MELLITUS WITH HYPERGLYCEMIA, WITHOUT LONG-TERM CURRENT USE OF INSULIN: Primary | ICD-10-CM

## 2025-02-19 NOTE — TELEPHONE ENCOUNTER
Leyda , Cover my meds wants dx code and the one type 2 diabetes mellitus without complication , without long term use of insulin is not one of them

## 2025-02-25 ENCOUNTER — OFFICE VISIT (OUTPATIENT)
Dept: OTOLARYNGOLOGY | Facility: CLINIC | Age: 30
End: 2025-02-25
Payer: MEDICAID

## 2025-02-25 VITALS
WEIGHT: 229 LBS | TEMPERATURE: 98 F | HEART RATE: 88 BPM | SYSTOLIC BLOOD PRESSURE: 135 MMHG | DIASTOLIC BLOOD PRESSURE: 76 MMHG | BODY MASS INDEX: 38.11 KG/M2

## 2025-02-25 DIAGNOSIS — H91.90 SUBJECTIVE HEARING LOSS: ICD-10-CM

## 2025-02-25 DIAGNOSIS — R11.2 NAUSEA AND VOMITING IN ADULT: ICD-10-CM

## 2025-02-25 DIAGNOSIS — J32.9 CHRONIC RHINOSINUSITIS: Primary | ICD-10-CM

## 2025-02-25 PROCEDURE — 99214 OFFICE O/P EST MOD 30 MIN: CPT | Mod: PBBFAC | Performed by: OTOLARYNGOLOGY

## 2025-02-25 RX ORDER — CETIRIZINE HYDROCHLORIDE 10 MG/1
10 TABLET ORAL DAILY
Qty: 30 TABLET | Refills: 6 | Status: SHIPPED | OUTPATIENT
Start: 2025-02-25

## 2025-02-25 RX ORDER — FLUTICASONE PROPIONATE 50 MCG
2 SPRAY, SUSPENSION (ML) NASAL DAILY
Qty: 15.8 ML | Refills: 6 | Status: SHIPPED | OUTPATIENT
Start: 2025-02-25

## 2025-02-25 NOTE — PROGRESS NOTES
Patient Name: Cassandra Escobedo   YOB: 1995     Chief Complaint:   Follow-up for chronic rhinosinusitis     History of Present Illness:  Cassandra Escobedo is a 29 y.o. female with pmh of anxiety and depression who presents to clinic for evaluation of headaches and sinusitis. She states that the headaches began 2 years ago and have increased in intensity over the last year.  Pain begins on the rt side of the face and moves to the lt. The headaches are associated with rhinorrhea, tinnitus, N/V, watery eyes and dizziness. She has also been experiencing new onset epistaxis (about 5 in the last several weeks). She has been given propanolol in the past for the headaches but states that it does not provide relief. The headaches are triggered by caffeine, alcohol, chocolate and stress. MRI performed on 10/17/22 showed a left maxillary sinus mucosal retention cyst w/ no intracranial abnormalities. She has used Flonase and cetirizine in the past with minimal relief. Pertinent family history includes migraines (maternal aunt) and allergic rhinitis (mother). Referral to neurology pending.     1/12/23:  Here today for follow-up.  Patient states she is feeling better since last seen.  She is having her headache and facial every couple of days every day.  She still does have fairly frequent migraines. She has neurology appt. She has been using flonse, nasal saline, and antishistamines with mild improvement. Still endorses occasional rhinorrhea and facial pain. Sense of smell is okay. No nasal obstruction and can breathe well through nose.     2/23/23:  Presents today for follow up of her chronic rhinosinusitis.  She is doing well today.  Only complaint is intermittent sharp pains in the frontal area usually associated with her migraines.  She denies nasal congestion, rhinorrhea, hyposmia, post-nasal drip, and facial pain over the maxillary isnuses.  She has been using flonase, nasal saline and  antihistamines.    24:  Patient here today for follow up of her chronic rhinosinusitis.  She is doing well today.  She denies nasal congestion, rhinorrhea, hyposmia, post-nasal drip, and facial pain over the maxillary isnuses.  She has been using flonase and antihistamines.    :   Patient presents today for follow-up of her chronic rhinosinusitis.  She continues to take fluticasone nasal spray daily in cetirizine.  She has been doing well.  She will only infrequently have sinus pressure and congestion.  Currently she has no nasal congestion, rhinorrhea, or postnasal drainage.  Of note she had flu recently.  A new problem today is that of a 3-4 month history of recurrent nausea whenever she places ear buds or ear muffs over her right ear.  This is not associated with any vomiting.  The nausea resolves as soon as she removes the ear but her ear month.  She does not have any pain associated with this.  She denies any associated vertigo or dizziness.  No noticeable change in her hearing.  She does not have a prior history of similar problems.  No problems with headaches recently.  No altered vision.  No recent trauma to the head.    Past Medical History:  Past Medical History:   Diagnosis Date    Hypertension     Migraine headache      Past Surgical History:   Procedure Laterality Date     SECTION  2018     SECTION      cycst removal from left wrist      elbow Right     ELBOW SURGERY Right        Social History:  Social History[1]    Review of Systems:  Unremarkable except as mentioned above.    Current Medications:  Current Outpatient Medications   Medication Sig    topiramate (TOPAMAX) 25 MG tablet Take 1 tablet (25 mg total) by mouth every evening. (Patient taking differently: Take 25 mg by mouth daily as needed.)    blood-glucose meter,continuous (DEXCOM ) Misc As Directed (Patient not taking: Reported on 2025)    blood-glucose sensor (DEXCOM G7 SENSOR) Jenn  Apply 1 sensor every 10 days. (Patient not taking: Reported on 2/25/2025)    cetirizine (ZYRTEC) 10 MG tablet Take 1 tablet (10 mg total) by mouth once daily.    DULoxetine (CYMBALTA) 30 MG capsule Take 30mg+ 60mg by mouth daily.    DULoxetine (CYMBALTA) 60 MG capsule Take 30mg+ 60mg by mouth daily.    fluticasone propionate (FLONASE) 50 mcg/actuation nasal spray 2 sprays (100 mcg total) by Each Nostril route once daily.    metFORMIN (GLUCOPHAGE) 500 MG tablet Take 1 tablet (500 mg total) by mouth 2 (two) times daily with meals. For Diabetes    propranoloL (INDERAL) 80 MG tablet Take 1 tablet (80 mg total) by mouth 2 (two) times daily.    rizatriptan (MAXALT) 10 MG tablet Take 1 tablet (10 mg total) by mouth 2 (two) times daily as needed for Migraine.    traZODone (DESYREL) 50 MG tablet Take 50-100mg (1-2 tablets) by mouth nightly as needed for insomnia.     No current facility-administered medications for this visit.        Allergies:  Review of patient's allergies indicates:   Allergen Reactions    Phenergan plain     Promethazine     Sulfa (sulfonamide antibiotics)         Family History:  Family History   Problem Relation Name Age of Onset    Breast cancer Paternal Grandmother      Hyperlipidemia Father      Hyperlipidemia Mother      Hypertension Mother      Diabetes Mother         Physical Exam:  Vital signs:   Vitals:    02/25/25 0910   BP: 135/76   BP Location: Right arm   Patient Position: Sitting   Pulse: 88   Temp: 98.4 °F (36.9 °C)   TempSrc: Oral   Weight: 103.9 kg (229 lb)   General: Well-developed well-nourished female in no acute distress.  Voice is normal.  Head and face: Normocephalic.  No facial lesions.  No temporomandibular joint tenderness or click.  Ears:Right ear-auricle is normally developed.  External auditory canal is clear.  Tympanic membrane is nonerythematous.  Did complain of slight nausea and discomfort when the tragus was pressed down into the meatus of the ear. Left ear-auricle is  normally developed.  External auditory canal is clear.  Tympanic membrane is nonerythematous.   512 tuning fork exam: Bill testing lateralizes to the right. Rinne testing shows was not done.   Nose: Nasal dorsum unremarkable.  No significant septal deviation anteriorly.  Mild congestion with clear drainage.    Neck: Supple without adenopathy or thyromegaly.  Trachea is in the midline.  Parotid and submandibular glands are normal to palpation without masses or tenderness.  Eyes: Extraocular muscles intact.  No nystagmus.  No exophthalmos or enophthalmos.  Neurologic: Alert and oriented.  Cranial nerves 2-12 are grossly normal.          Assessment/Plan:  30-year-old female with chronic rhinosinusitis-stable.  Recurrent nausea with placement of ear bud or ear muff over the right ear association with abnormal tuning fork exam.      Plan:   Continue fluticasone nasal spray 2 puffs each nostril q.d. and cetirizine 10 mg p.o. q.d. for treatment of her rhinosinusitis.    Schedule audiogram with follow-up in 4-6 weeks with further plans pending those results.      Poncho Flores M.D.         [1]   Social History  Socioeconomic History    Marital status: Single   Occupational History    Occupation: [employer] / [employer]   Tobacco Use    Smoking status: Former     Current packs/day: 0.00     Average packs/day: 0.3 packs/day for 3.3 years (0.8 ttl pk-yrs)     Types: Cigarettes     Start date:      Quit date: 2016     Years since quittin.8     Passive exposure: Past    Smokeless tobacco: Never   Substance and Sexual Activity    Alcohol use: Not Currently     Comment: rarely    Drug use: Yes     Types: Marijuana     Comment: as needed    Sexual activity: Not Currently     Birth control/protection: None     Social Drivers of Health     Financial Resource Strain: Low Risk  (2025)    Overall Financial Resource Strain (CARDIA)     Difficulty of Paying Living Expenses: Not hard at all   Food  Insecurity: No Food Insecurity (1/27/2025)    Hunger Vital Sign     Worried About Running Out of Food in the Last Year: Never true     Ran Out of Food in the Last Year: Never true   Transportation Needs: No Transportation Needs (12/20/2023)    PRAPARE - Transportation     Lack of Transportation (Medical): No     Lack of Transportation (Non-Medical): No   Physical Activity: Unknown (1/27/2025)    Exercise Vital Sign     Days of Exercise per Week: 3 days   Stress: No Stress Concern Present (1/27/2025)    [employer] - Occupational Stress Questionnaire     Feeling of Stress : Not at all   Housing Stability: Low Risk  (12/20/2023)    Housing Stability Vital Sign     Unable to Pay for Housing in the Last Year: No     Number of Places Lived in the Last Year: 1     Unstable Housing in the Last Year: No

## 2025-03-11 ENCOUNTER — OFFICE VISIT (OUTPATIENT)
Dept: BEHAVIORAL HEALTH | Facility: CLINIC | Age: 30
End: 2025-03-11
Payer: MEDICAID

## 2025-03-11 DIAGNOSIS — F33.1 MODERATE EPISODE OF RECURRENT MAJOR DEPRESSIVE DISORDER: Primary | ICD-10-CM

## 2025-03-11 DIAGNOSIS — G47.00 INSOMNIA, UNSPECIFIED TYPE: ICD-10-CM

## 2025-03-11 DIAGNOSIS — F41.1 GAD (GENERALIZED ANXIETY DISORDER): ICD-10-CM

## 2025-03-11 RX ORDER — VENLAFAXINE HYDROCHLORIDE 75 MG/1
75 CAPSULE, EXTENDED RELEASE ORAL DAILY
Qty: 30 CAPSULE | Refills: 5 | Status: SHIPPED | OUTPATIENT
Start: 2025-03-11

## 2025-03-11 RX ORDER — TRAZODONE HYDROCHLORIDE 50 MG/1
TABLET ORAL
Qty: 60 TABLET | Refills: 2 | Status: SHIPPED | OUTPATIENT
Start: 2025-03-11

## 2025-03-11 NOTE — PROGRESS NOTES
"Outpatient Psychiatry Follow-Up Visit    3/11/2025    Clinical Status of Patient:  Outpatient (Ambulatory)    Chief Complaint:  Cassandra Escobedo is a 30 y.o. female who presents today for follow-up of depression and anxiety. Patient last seen for follow-up on 12/3/2024. Met with patient.      TELE PSYCHIATRY Disclaimer   *The patient was informed despite using HIPPA compliant technology there may be risks including security breach, technological failure, inability to perform a comprehensive physical exam which could delay or prevent an accurate diagnosis, and potential complications from treatment decisions rendered over a telemedical platform.   The patient was also informed of the relationship between the physician and patient and the respective role of any other health care provider with respect to management of the patient; and notified that the pt may decline to receive medical services by telemedicine and may withdraw from such care at any time.     Patient's Current location: [address] [place] [address]   In Case of Emergency pts next of kin  Name:Latonia Escobedo  Phone number:  [phone]   Visit type: Audio only  Total time spent with patient: 24 minutes    Interval History and Content of Current Session:  Interim Events/Subjective Report/Content of Current Session:   Pt reports doing "alright"  overall.  Stopped taking duloxetine due to intense nausea/vomiting, tried taking 60 + 30mg, 60mg and 30mg without much change.  Last took about 3 weeks ago.  Feels that her anxiety isn't as severe as before she took cymbalta.  Reports stable mood, manageable anxiety.  Sleep 4-5 hrs nightly, tired on awakening, 3x nightly awakenings, 1-2x nocturia, +EDS. Appetite variable lower, weight decreasing (pt happy about).  Energy poor, motivation poor.  Occasional irritability, denies hopelessness.  Denies SI/HI/AVH/paranoia, denies plan or desire for self harm or harm to others.  Reports SE from current regimen: " "nausea and vomiting from duloxetine.  Pt voices desire to adjust regimen to address ongoing symptoms.      Psychiatric Review of Systems-is patient experiencing or having changes in  See HPI above.     Review of Systems   PSYCHIATRIC: Pertinant items are noted in the narrative.  CONSTITUTIONAL: No weight gain or loss.  +fatigue  MUSCULOSKELETAL: No pain or stiffness of the joints.  NEUROLOGIC: No weakness, sensory changes, seizures, confusion, memory loss, tremor or other abnormal movements.  CARDIAC: No CP, no palpitations  RESPIRATORY: No shortness of breath.  CARDIOVASCULAR: No tachycardia or chest pain.  GASTROINTESTINAL: Denies abdominal pain, constipation or diarrhea.  +nausea and vomiting    Past Medical, Family and Social History: The patient's past medical, family and social history have been reviewed and updated as appropriate within the electronic medical record - see encounter notes.    Compliance: good    Side effects: nausea and vomiting from duloxetine    Risk Parameters:  Patient reports no suicidal ideation  Patient reports no homicidal ideation  Patient reports no self-injurious behavior  Patient reports no violent behavior    Exam (detailed: at least 9 elements; comprehensive: all 15 elements)   Constitutional  Vitals:  Most recent vital signs, dated less than 90 days prior to this appointment, were reviewed.     There were no vitals filed for this visit.       General:   Constitutional: JHONY 2/2 virtual visit    Neurologic:   Motor: JHONY 2/2 virtual visit  Gait: JHONY 2/2 virtual visit    Mental status examination:   Appearance: JHONY 2/2 virtual visit  Behavior: calm and cooperative  Mood: "ok"  Affect: JHONY  Thought process: linear and goal directed  Associations: appropriate for conversation  Thought content: no plan or desire for self harm or harm to others, denies paranoia, no delusional ideation volunteered  Perceptions: denies hallucinations or other altered perceptions  Orientation: oriented to " day of week, month, year, location, and situation  Language: English, fluid  Attention: able to attend to interview  Insight: good  Judgement: good    PHQ9:  Over the last two weeks how often have you been bothered by little interest or pleasure in doing things: 0  Over the last two weeks how often have you been bothered by feeling down, depressed or hopeless: 0  PHQ-2 Total Score: 0    GAD7:      3/30/2023    11:34 AM 11/30/2022     9:51 AM   GAD7   1. Feeling nervous, anxious, or on edge? 1 3   2. Not being able to stop or control worrying? 1 3   3. Worrying too much about different things? 1 3   4. Trouble relaxing? 0 3   5. Being so restless that it is hard to sit still? 0 3   6. Becoming easily annoyed or irritable? 0 3   7. Feeling afraid as if something awful might happen? 0 2   8. If you checked off any problems, how difficult have these problems made it for you to do your work, take care of things at home, or get along with other people? 0 2   YOSEF-7 Score 3 20     Assessment and Diagnosis   Status/Progress: Based on the examination today, the patient's problem(s) is/are adequately but not ideally controlled.  New problems have not been presented today.   Co-morbidities and Lack of compliance are not complicating management of the primary condition.  Number of separate conditions addressed during today's visit: 2 (mood well controlled but with SE of treatment, sleep uncontrolled).  Medication management: yes: Starting a medication, Stopping a medication, Refilling a prescription, and Deciding to continue a pre-existing prescription.    Are referral(s) being ordered today: No.  Complexity (level) of medical decision making employed in the encounter: MODERATE.    General Impression:    ICD-10-CM ICD-9-CM   1. Moderate episode of recurrent major depressive disorder  F33.1 296.32   2. YOSEF (generalized anxiety disorder)  F41.1 300.02   3. Insomnia, unspecified type  G47.00 780.52      Intervention/Counseling/Treatment Plan   Stop cymbalta 2/2 SE  Start Effexor XR 75mg daily, Discussed potential SE including but not limited to GI upset, headache, HTN, tachycardia, suicidal thinking  Continue trazodone 50-100mg nightly prn insomnia, pt has not tried due to pharmacy issue  No need for PEC as pt is not an imminent danger to self or others or gravely disabled due to acute psychiatric illness  Discussed that pt should either call clinic for psychiatric crisis symptoms or present to nearest emergency room    Discussed with patient informed consent including diagnosis, risks and benefits of proposed treatment above vs. alternative treatments vs. no treatment, as well as serious and common side effects of these treatments, and the inherent unpredictability of individual responses to these treatments. The patient expresses understanding of the above and displays the capacity to agree with this current plan. Patient also agrees that, currently, the benefits outweigh the risks and would like to pursue treatment at this time, and had no other questions.    Instructions:  Take all medications as prescribed.    Abstain from recreational drugs and alcohol.  Present to ED or call 911 for SI/HI plan or intent, psychosis, or medical emergency.    Return to Clinic: Follow up if symptoms worsen or fail to improve.  Given that provider is leaving in the near future, will send list of psychiatry providers to pt's portal account to assist with continuation of care.     Total time:     The total time for services performed on the date of the encounter: 24 minutes    Leo Chaney MD  [facility]

## 2025-03-13 ENCOUNTER — CLINICAL SUPPORT (OUTPATIENT)
Dept: AUDIOLOGY | Facility: HOSPITAL | Age: 30
End: 2025-03-13
Payer: MEDICAID

## 2025-03-13 DIAGNOSIS — H91.90 HEARING DISORDER, UNSPECIFIED LATERALITY: Primary | ICD-10-CM

## 2025-03-13 DIAGNOSIS — H91.90 SUBJECTIVE HEARING LOSS: ICD-10-CM

## 2025-03-13 PROCEDURE — 92557 COMPREHENSIVE HEARING TEST: CPT | Performed by: AUDIOLOGIST

## 2025-03-13 PROCEDURE — 92567 TYMPANOMETRY: CPT | Performed by: AUDIOLOGIST

## 2025-03-13 NOTE — PROGRESS NOTES
Hearing Evaluation      Patient History: Ms. Escobedo is a 30 y.o. patient in for a hearing evaluation due to concerns with nausea when anything goes into or around right ear (ie. Air buds, ear muffs). She denies hearing loss, tinnitus, vertigo, or middle ear issues. All additional history is unremarkable.      Test Results:       Pure Tone Testing:     Right ear:   Normal peripheral hearing sensitivity from 250-8kHz. Speech reception threshold is in agreement with puretone findings.  Discrimination score of 100% is considered excellent.      Left ear: Normal peripheral hearing sensitivity from 250-8kHz. Speech reception threshold is in agreement with puretone findings.  Discrimination score of 100% is considered excellent.         Tympanometry:        Right ear:   Type 'A' tymp, normal middle ear pressure/function    Left ear: Type 'A' tymp, normal middle ear pressure/function       Distortion Product Otoacoustic Emission Testing (DPOAE):         Right ear: Present emissions from 1k-6kHz      Left ear: Present emissions from 1k-6kHz         Interpretations:     Behavioral test findings indicate hearing within normal limits, bilaterally. Speech reception thresholds obtained at 10dB, AU, and are in agreement with puretone findings. Speech discrimination scores of 100%, AU, are considered excellent.  DPOAE findings indicate normal cochlear outer hair cell function, bilaterally. Immittance measures indicate normal middle ear pressure/function, bilaterally.         Recommendations:   Continue with ENT follow up  RTC per ENT

## 2025-04-22 ENCOUNTER — LAB VISIT (OUTPATIENT)
Dept: LAB | Facility: HOSPITAL | Age: 30
End: 2025-04-22
Attending: NURSE PRACTITIONER
Payer: MEDICAID

## 2025-04-22 DIAGNOSIS — E11.9 TYPE 2 DIABETES MELLITUS WITHOUT COMPLICATION, WITHOUT LONG-TERM CURRENT USE OF INSULIN: ICD-10-CM

## 2025-04-22 DIAGNOSIS — Z00.00 WELLNESS EXAMINATION: ICD-10-CM

## 2025-04-22 LAB
25(OH)D3+25(OH)D2 SERPL-MCNC: 44 NG/ML (ref 30–80)
ALBUMIN SERPL-MCNC: 4.4 G/DL (ref 3.4–5)
ALBUMIN/GLOB SERPL: 1.3 RATIO
ALP SERPL-CCNC: 87 UNIT/L (ref 50–144)
ALT SERPL-CCNC: 270 UNIT/L (ref 1–45)
ANION GAP SERPL CALC-SCNC: 6 MEQ/L (ref 2–13)
AST SERPL-CCNC: 116 UNIT/L (ref 14–36)
BASOPHILS # BLD AUTO: 0.04 X10(3)/MCL (ref 0.01–0.08)
BASOPHILS NFR BLD AUTO: 0.5 % (ref 0.1–1.2)
BILIRUB SERPL-MCNC: 0.5 MG/DL (ref 0–1)
BILIRUB UR QL STRIP.AUTO: NEGATIVE
BUN SERPL-MCNC: 11 MG/DL (ref 7–20)
CALCIUM SERPL-MCNC: 9.6 MG/DL (ref 8.4–10.2)
CHLORIDE SERPL-SCNC: 106 MMOL/L (ref 98–110)
CHOLEST SERPL-MCNC: 230 MG/DL (ref 0–200)
CLARITY UR: CLEAR
CO2 SERPL-SCNC: 24 MMOL/L (ref 21–32)
COLOR UR AUTO: YELLOW
CREAT SERPL-MCNC: 0.45 MG/DL (ref 0.66–1.25)
CREAT UR-MCNC: 114.1 MG/DL (ref 45–106)
CREAT/UREA NIT SERPL: 24 (ref 12–20)
EOSINOPHIL # BLD AUTO: 0.15 X10(3)/MCL (ref 0.04–0.36)
EOSINOPHIL NFR BLD AUTO: 2 % (ref 0.7–7)
ERYTHROCYTE [DISTWIDTH] IN BLOOD BY AUTOMATED COUNT: 13.9 % (ref 11–14.5)
EST. AVERAGE GLUCOSE BLD GHB EST-MCNC: 248.9 MG/DL (ref 70–115)
GFR SERPLBLD CREATININE-BSD FMLA CKD-EPI: >90 ML/MIN/1.73/M2
GLOBULIN SER-MCNC: 3.4 GM/DL (ref 2–3.9)
GLUCOSE SERPL-MCNC: 191 MG/DL (ref 70–115)
GLUCOSE UR QL STRIP: NEGATIVE
HBA1C MFR BLD: 10.3 % (ref 4–6)
HCT VFR BLD AUTO: 45.3 % (ref 36–48)
HDLC SERPL-MCNC: 55 MG/DL (ref 40–60)
HGB BLD-MCNC: 14.2 G/DL (ref 11.8–16)
HGB UR QL STRIP: NEGATIVE
IMM GRANULOCYTES # BLD AUTO: 0.01 X10(3)/MCL (ref 0–0.03)
IMM GRANULOCYTES NFR BLD AUTO: 0.1 % (ref 0–0.5)
KETONES UR QL STRIP: NEGATIVE
LDLC SERPL DIRECT ASSAY-SCNC: 145.7 MG/DL (ref 30–100)
LEUKOCYTE ESTERASE UR QL STRIP: NEGATIVE
LYMPHOCYTES # BLD AUTO: 2.82 X10(3)/MCL (ref 1.16–3.74)
LYMPHOCYTES NFR BLD AUTO: 36.7 % (ref 20–55)
MCH RBC QN AUTO: 25.5 PG (ref 27–34)
MCHC RBC AUTO-ENTMCNC: 31.3 G/DL (ref 31–37)
MCV RBC AUTO: 81.5 FL (ref 79–99)
MICROALBUMIN UR-MCNC: 8.3 UG/ML
MICROALBUMIN/CREAT RATIO PNL UR: 7.3 MG/GM CR (ref 0–30)
MONOCYTES # BLD AUTO: 0.43 X10(3)/MCL (ref 0.24–0.36)
MONOCYTES NFR BLD AUTO: 5.6 % (ref 4.7–12.5)
NEUTROPHILS # BLD AUTO: 4.23 X10(3)/MCL (ref 1.56–6.13)
NEUTROPHILS NFR BLD AUTO: 55.1 % (ref 37–73)
NITRITE UR QL STRIP: NEGATIVE
NRBC BLD AUTO-RTO: 0 %
PH UR STRIP: 6 [PH]
PLATELET # BLD AUTO: 247 X10(3)/MCL (ref 140–371)
PMV BLD AUTO: 11.2 FL (ref 9.4–12.4)
POTASSIUM SERPL-SCNC: 4.3 MMOL/L (ref 3.5–5.1)
PROT SERPL-MCNC: 7.8 GM/DL (ref 6.3–8.2)
PROT UR QL STRIP: NEGATIVE
RBC # BLD AUTO: 5.56 X10(6)/MCL (ref 4–5.1)
SODIUM SERPL-SCNC: 136 MMOL/L (ref 136–145)
SP GR UR STRIP.AUTO: 1.02 (ref 1–1.03)
T4 FREE SERPL-MCNC: 1.35 NG/DL (ref 0.78–2.19)
TRIGL SERPL-MCNC: 222 MG/DL (ref 30–200)
TSH SERPL-ACNC: 0.94 UIU/ML (ref 0.36–3.74)
UROBILINOGEN UR STRIP-ACNC: 0.2
WBC # BLD AUTO: 7.68 X10(3)/MCL (ref 4–11.5)

## 2025-04-22 PROCEDURE — 84443 ASSAY THYROID STIM HORMONE: CPT

## 2025-04-22 PROCEDURE — 83036 HEMOGLOBIN GLYCOSYLATED A1C: CPT

## 2025-04-22 PROCEDURE — 84439 ASSAY OF FREE THYROXINE: CPT

## 2025-04-22 PROCEDURE — 85025 COMPLETE CBC W/AUTO DIFF WBC: CPT

## 2025-04-22 PROCEDURE — 80061 LIPID PANEL: CPT

## 2025-04-22 PROCEDURE — 82570 ASSAY OF URINE CREATININE: CPT

## 2025-04-22 PROCEDURE — 80053 COMPREHEN METABOLIC PANEL: CPT

## 2025-04-22 PROCEDURE — 82306 VITAMIN D 25 HYDROXY: CPT

## 2025-04-22 PROCEDURE — 81003 URINALYSIS AUTO W/O SCOPE: CPT

## 2025-04-22 PROCEDURE — 36415 COLL VENOUS BLD VENIPUNCTURE: CPT

## 2025-04-23 ENCOUNTER — RESULTS FOLLOW-UP (OUTPATIENT)
Dept: INTERNAL MEDICINE | Facility: CLINIC | Age: 30
End: 2025-04-23

## 2025-04-24 ENCOUNTER — OFFICE VISIT (OUTPATIENT)
Dept: INTERNAL MEDICINE | Facility: CLINIC | Age: 30
End: 2025-04-24
Payer: MEDICAID

## 2025-04-24 VITALS
SYSTOLIC BLOOD PRESSURE: 133 MMHG | HEART RATE: 67 BPM | WEIGHT: 233 LBS | BODY MASS INDEX: 38.82 KG/M2 | HEIGHT: 65 IN | TEMPERATURE: 98 F | DIASTOLIC BLOOD PRESSURE: 87 MMHG | RESPIRATION RATE: 16 BRPM

## 2025-04-24 DIAGNOSIS — E11.65 TYPE 2 DIABETES MELLITUS WITH HYPERGLYCEMIA, WITHOUT LONG-TERM CURRENT USE OF INSULIN: ICD-10-CM

## 2025-04-24 DIAGNOSIS — F33.1 MODERATE EPISODE OF RECURRENT MAJOR DEPRESSIVE DISORDER: ICD-10-CM

## 2025-04-24 DIAGNOSIS — E78.2 MIXED HYPERLIPIDEMIA: ICD-10-CM

## 2025-04-24 DIAGNOSIS — F41.1 GAD (GENERALIZED ANXIETY DISORDER): ICD-10-CM

## 2025-04-24 DIAGNOSIS — Z00.00 WELLNESS EXAMINATION: Primary | ICD-10-CM

## 2025-04-24 DIAGNOSIS — F90.9 ATTENTION DEFICIT HYPERACTIVITY DISORDER (ADHD), UNSPECIFIED ADHD TYPE: ICD-10-CM

## 2025-04-24 DIAGNOSIS — R79.89 ELEVATED LFTS: ICD-10-CM

## 2025-04-24 PROBLEM — E11.9 TYPE 2 DIABETES MELLITUS WITHOUT COMPLICATION, WITHOUT LONG-TERM CURRENT USE OF INSULIN: Status: RESOLVED | Noted: 2023-05-18 | Resolved: 2025-04-24

## 2025-04-24 PROCEDURE — 99214 OFFICE O/P EST MOD 30 MIN: CPT | Mod: PBBFAC | Performed by: NURSE PRACTITIONER

## 2025-04-24 RX ORDER — FLASH GLUCOSE SCANNING READER
EACH MISCELLANEOUS
Qty: 1 EACH | Refills: 0 | Status: SHIPPED | OUTPATIENT
Start: 2025-04-24

## 2025-04-24 RX ORDER — SEMAGLUTIDE 0.68 MG/ML
INJECTION, SOLUTION SUBCUTANEOUS
Qty: 7.5 ML | Refills: 0 | Status: SHIPPED | OUTPATIENT
Start: 2025-04-24 | End: 2025-07-23

## 2025-04-24 RX ORDER — BLOOD-GLUCOSE SENSOR
EACH MISCELLANEOUS
Qty: 2 EACH | Refills: 12 | Status: SHIPPED | OUTPATIENT
Start: 2025-04-24

## 2025-04-24 RX ORDER — ATORVASTATIN CALCIUM 20 MG/1
20 TABLET, FILM COATED ORAL NIGHTLY
Qty: 90 TABLET | Refills: 4 | Status: SHIPPED | OUTPATIENT
Start: 2025-04-24 | End: 2026-07-18

## 2025-04-24 NOTE — ASSESSMENT & PLAN NOTE
Referral to  - YOSEF   RX Cymbalta 60 mg daily  Read positive daily meditations, avoid negative media, set healthy boundaries.  Exercise daily, keep consistent sleep pattern, eat a healthy diet.  Establish good social support, make changes to reduce stress.  Do not drink alcohol or use illicit drugs.  Reports any symptoms of suicidal/homicidal ideations or self harm immediately, go to nearest emergency room.

## 2025-04-24 NOTE — ASSESSMENT & PLAN NOTE
A1C Above goal   DC Metformin due to GI SE  Start RX ozempic 0.25 x 4 weeks then increase to 0.5 mg weekly  Freestyle Selvin 3 sent to pharmacy   Start glucose monitoring am fasting  2 month f/u via virtual   Follow ADA diet.  Avoid soda, simple sweets, and limit rice/pasta/bread/starches and consume brown options when possible.   Maintain healthy weight with BMI goal <30.   Perform aerobic exercise for 150 minutes per week (or 5 days a week for 30 minutes each day).   Examine feet daily.     Lab Results   Component Value Date    HGBA1C 10.3 (H) 04/22/2025

## 2025-04-24 NOTE — PROGRESS NOTES
GRACY Ambrosio   [facility]  [address] [address]      PATIENT NAME: Cassandra Escobedo  : 1995  DATE: 25  MRN: [id number]      Reason for Visit / Chief Complaint: Diabetes (Lab review )       History of Present Illness / Problem Focused Workflow     Cassandra Escobedo presents to the clinic with Diabetes (Lab review )     30 yo WF here today for f/u. Medical problems include T2DM, Migraines & anxiety. Followed by [facility] and Dr. Chaney.     Cervical Cancer Screening: [facility] - F/U   Osteoporosis Screenin23 Vitamin D Level   Diabetic Screening: [facility]   HCV Screening: 10/03/22  Wellness: 2025  History of Present Illness  Patient presents today for follow up She reports experiencing nausea with metformin, describing feeling like she will vomit after taking it in recent weeks, will DC today. Despite having multiple glucose monitoring devices at home, she admits to not checking her blood sugar. A1c has increased from 7.1 in August to 10.3 currently. Med changes discussed. She receives depression medication from her psychologist. She expresses interest in discussing ADHD medication with a specialist. Will send referral to new provider to establish care. Liver function tests remain elevated since November. Agreeable to fibroscan, aware of appt with GI in September. Thyroid levels and urinalysis are normal. Cholesterol levels are elevated. Agreeable to starting statin. Will f/u in 2 months via virtual with labs and prn.                 Review of Systems     Review of Systems   Constitutional: Negative.    HENT: Negative.     Eyes: Negative.    Respiratory: Negative.     Cardiovascular: Negative.    Gastrointestinal: Negative.    Endocrine: Negative.    Genitourinary: Negative.    Neurological: Negative.    Psychiatric/Behavioral: Negative.           Medications and Allergies      Medications  Medication List with Changes/Refills   New Medications    ATORVASTATIN (LIPITOR) 20 MG TABLET    Take 1 tablet (20 mg total) by mouth every evening. For High Cholesterol & Diabetes    BLOOD-GLUCOSE SENSOR (FREESTYLE MAUREEN 3 SENSOR) COSME    For Continuous Glucose Monitoring.    FLASH GLUCOSE SCANNING READER (FREESTYLE MAUREEN 2 READER) MISC    FREESTYLE MAUREEN 3 READER    SEMAGLUTIDE (OZEMPIC) 0.25 MG OR 0.5 MG (2 MG/3 ML) PEN INJECTOR    Inject 0.25 mg into the skin every 7 days for 30 days, THEN 0.5 mg every 7 days. For Diabetes.   Current Medications    BLOOD-GLUCOSE METER,CONTINUOUS (DEXCOM ) MISC    As Directed    CETIRIZINE (ZYRTEC) 10 MG TABLET    Take 1 tablet (10 mg total) by mouth once daily.    FLUTICASONE PROPIONATE (FLONASE) 50 MCG/ACTUATION NASAL SPRAY    2 sprays (100 mcg total) by Each Nostril route once daily.    PROPRANOLOL (INDERAL) 80 MG TABLET    Take 1 tablet (80 mg total) by mouth 2 (two) times daily.    RIZATRIPTAN (MAXALT) 10 MG TABLET    Take 1 tablet (10 mg total) by mouth 2 (two) times daily as needed for Migraine.    TOPIRAMATE (TOPAMAX) 25 MG TABLET    Take 1 tablet (25 mg total) by mouth every evening.    TRAZODONE (DESYREL) 50 MG TABLET    Take 50-100mg (1-2 tablets) by mouth nightly as needed for insomnia.    VENLAFAXINE (EFFEXOR-XR) 75 MG 24 HR CAPSULE    Take 1 capsule (75 mg total) by mouth once daily.   Discontinued Medications    BLOOD-GLUCOSE SENSOR (DEXCOM G7 SENSOR) COSME    Apply 1 sensor every 10 days.    METFORMIN (GLUCOPHAGE) 500 MG TABLET    Take 1 tablet (500 mg total) by mouth 2 (two) times daily with meals. For Diabetes         Allergies  Review of patient's allergies indicates:   Allergen Reactions    Phenergan plain     Promethazine     Sulfa (sulfonamide antibiotics)        Physical Examination     Vitals:    04/24/25 0953   BP: 133/87   Pulse: 67   Resp: 16   Temp: 98.1 °F (36.7 °C)     Physical Exam  Vitals reviewed.   Constitutional:        Appearance: Normal appearance. She is obese.   HENT:      Head: Normocephalic.   Cardiovascular:      Rate and Rhythm: Normal rate and regular rhythm.      Pulses: Normal pulses.      Heart sounds: Normal heart sounds.   Pulmonary:      Effort: Pulmonary effort is normal.      Breath sounds: Normal breath sounds.   Abdominal:      General: Abdomen is flat.      Palpations: Abdomen is soft.   Musculoskeletal:         General: Normal range of motion.      Cervical back: Normal range of motion.   Skin:     General: Skin is warm and dry.   Neurological:      Mental Status: She is alert.   Psychiatric:         Mood and Affect: Mood normal.           Results     Lab Results   Component Value Date    WBC 7.68 04/22/2025    RBC 5.56 (H) 04/22/2025    HGB 14.2 04/22/2025    HCT 45.3 04/22/2025    MCV 81.5 04/22/2025    MCH 25.5 (L) 04/22/2025    MCHC 31.3 04/22/2025    RDW 13.9 04/22/2025     04/22/2025    MPV 11.2 04/22/2025     Sodium   Date Value Ref Range Status   04/22/2025 136 136 - 145 mmol/L Final     Potassium   Date Value Ref Range Status   04/22/2025 4.3 3.5 - 5.1 mmol/L Final     Chloride   Date Value Ref Range Status   04/22/2025 106 98 - 110 mmol/L Final     CO2   Date Value Ref Range Status   04/22/2025 24 21 - 32 mmol/L Final     Blood Urea Nitrogen   Date Value Ref Range Status   04/22/2025 11 7.0 - 20.0 mg/dL Final     Creatinine   Date Value Ref Range Status   04/22/2025 0.45 (L) 0.66 - 1.25 mg/dL Final     Calcium   Date Value Ref Range Status   04/22/2025 9.6 8.4 - 10.2 mg/dL Final     Albumin   Date Value Ref Range Status   04/22/2025 4.4 3.4 - 5.0 g/dL Final     Bilirubin Total   Date Value Ref Range Status   04/22/2025 0.5 0.0 - 1.0 mg/dL Final     ALP   Date Value Ref Range Status   04/22/2025 87 50 - 144 unit/L Final     AST   Date Value Ref Range Status   04/22/2025 116 (H) 14 - 36 unit/L Final     ALT   Date Value Ref Range Status   04/22/2025 270 (H) 1 - 45 unit/L Final     eGFR   Date  Value Ref Range Status   09/15/2021 159 mL/min/1.73 m2 Final     Lab Results   Component Value Date    CHOL 230 (H) 04/22/2025     Lab Results   Component Value Date    HDL 55 04/22/2025     Lab Results   Component Value Date    TRIG 222 (H) 04/22/2025     Lab Results   Component Value Date    VLDL 44 12/21/2023     Lab Results   Component Value Date    .00 12/21/2023     Lab Results   Component Value Date    TSH 0.938 04/22/2025     Lab Results   Component Value Date    PHUR 6.0 04/22/2025    PROTEINUA Negative 04/22/2025    GLUCUA Negative 04/22/2025    OCCULTUA Negative 04/22/2025    NITRITE Negative 04/22/2025    LEUKOCYTESUR Negative 04/22/2025     Lab Results   Component Value Date    HGBA1C 10.3 (H) 04/22/2025    HGBA1C 7.1 (H) 08/14/2024    HGBA1C 6.2 12/21/2023           Assessment         ICD-10-CM ICD-9-CM   1. Wellness examination  Z00.00 V70.0   2. Elevated LFTs  R79.89 790.6   3. Type 2 diabetes mellitus with hyperglycemia, without long-term current use of insulin  E11.65 250.00     790.29   4. YOSEF (generalized anxiety disorder)  F41.1 300.02   5. Moderate episode of recurrent major depressive disorder  F33.1 296.32   6. Attention deficit hyperactivity disorder (ADHD), unspecified ADHD type  F90.9 314.01   7. Mixed hyperlipidemia  E78.2 272.2       Plan      1. Wellness examination  Assessment & Plan:  Pt wellness visit completed today with appropriate lab work.    Topics Reviewed / Updated  Immunizations Discussed  Dicussed Healthy Diet &   Encouraged to exercise 3 x weekly  Increase Water Intake  Eat more fruits and vegetables  Avoid soda & alcohol        2. Elevated LFTs  Assessment & Plan:  F/U [facility] to establish care as directed  Fibroscan ordered   US ABD limited on file   Repeat CMP in 2 months   ED Precautions  Tylenol and EtOH abstinences     Orders:  -     FibroScan (Vibration Controlled Transient Elastography); Future; Expected date: 04/24/2025  -     Comprehensive Metabolic  Panel; Future; Expected date: 04/24/2025  -     Hemoglobin A1C; Future; Expected date: 04/24/2025    3. Type 2 diabetes mellitus with hyperglycemia, without long-term current use of insulin  Assessment & Plan:  A1C Above goal   DC Metformin due to GI SE  Start RX ozempic 0.25 x 4 weeks then increase to 0.5 mg weekly  Freestyle Selvin 3 sent to pharmacy   Start glucose monitoring am fasting  2 month f/u via virtual   Follow ADA diet.  Avoid soda, simple sweets, and limit rice/pasta/bread/starches and consume brown options when possible.   Maintain healthy weight with BMI goal <30.   Perform aerobic exercise for 150 minutes per week (or 5 days a week for 30 minutes each day).   Examine feet daily.     Lab Results   Component Value Date    HGBA1C 10.3 (H) 04/22/2025         Orders:  -     blood-glucose sensor (FREESTYLE SELVIN 3 SENSOR) Jenn; For Continuous Glucose Monitoring.  Dispense: 2 each; Refill: 12  -     flash glucose scanning reader (FREESTYLE SELVIN 2 READER) Misc; FREESTYLE SELVIN 3 READER  Dispense: 1 each; Refill: 0  -     semaglutide (OZEMPIC) 0.25 mg or 0.5 mg (2 mg/3 mL) pen injector; Inject 0.25 mg into the skin every 7 days for 30 days, THEN 0.5 mg every 7 days. For Diabetes.  Dispense: 7.5 mL; Refill: 0  -     Hemoglobin A1C; Future; Expected date: 04/24/2025    4. YOSEF (generalized anxiety disorder)  Assessment & Plan:  Referral to  - YOSEF   RX Cymbalta 60 mg daily  Read positive daily meditations, avoid negative media, set healthy boundaries.  Exercise daily, keep consistent sleep pattern, eat a healthy diet.  Establish good social support, make changes to reduce stress.  Do not drink alcohol or use illicit drugs.  Reports any symptoms of suicidal/homicidal ideations or self harm immediately, go to nearest emergency room.      Orders:  -     Ambulatory referral/consult to Behavioral Health; Future; Expected date: 04/24/2025    5. Moderate episode of recurrent major depressive disorder  Assessment &  Plan:  Referral to  - YOSEF   RX Cymbalta 60 mg daily  Read positive daily meditations, avoid negative media, set healthy boundaries.  Exercise daily, keep consistent sleep pattern, eat a healthy diet.  Establish good social support, make changes to reduce stress.  Do not drink alcohol or use illicit drugs.  Reports any symptoms of suicidal/homicidal ideations or self harm immediately, go to nearest emergency room.    Orders:  -     Ambulatory referral/consult to Behavioral Health; Future; Expected date: 04/24/2025    6. Attention deficit hyperactivity disorder (ADHD), unspecified ADHD type  Assessment & Plan:  Referral to      Orders:  -     Ambulatory referral/consult to Behavioral Health; Future; Expected date: 04/24/2025    7. Mixed hyperlipidemia  Assessment & Plan:  Flp above goal  Start RX atorvastatin 20 mg qhs  Repeat labs in 2 months   Follow a low cholesterol, low saturated fat diet with less than 200 mg of cholesterol a day.   Avoid fried foods and high saturated fats (nieto, sausage, cookies, cakes, chips, cheese, whole milk, butter, mayonnaise, creamy dressings, gravy, stew, gumbo, boudin, cracklins and cream sauces).  Add flax seed or fish oil supplements to diet.   Increase dietary fiber.   Regular exercise improves cholesterol levels.  Physical activity 5 times a week for 30 minutes per day (or 150 minutes per week).   Stressed importance of dietary modifications.    Lab Results   Component Value Date    CHOL 230 (H) 04/22/2025     Lab Results   Component Value Date    HDL 55 04/22/2025     Lab Results   Component Value Date    TRIG 222 (H) 04/22/2025     Lab Results   Component Value Date    VLDL 44 12/21/2023     Lab Results   Component Value Date    .00 12/21/2023         Orders:  -     atorvastatin (LIPITOR) 20 MG tablet; Take 1 tablet (20 mg total) by mouth every evening. For High Cholesterol & Diabetes  Dispense: 90 tablet; Refill: 4  -     Lipid Panel; Future; Expected date:  04/24/2025         Future Appointments   Date Time Provider Department Center   6/9/2025  2:00 PM Maricel Weems ANP [facility] NEURO [place]    6/27/2025  8:40 AM Leyda Sanders, DONN [facility] INTMED [place]    9/29/2025  1:00 PM Cheryl Dominguez, GRACY [facility] GASTRO [place]    10/8/2025  1:30 PM Amie Bethea DONN [facility] GYN [place]    1/6/2026  9:00 AM Poncho Flores MD [facility] ENT [place]            Signature:     [facility]  [address] [address]    Date of encounter: 4/24/25    This note was generated with the assistance of ambient listening technology. Verbal consent was obtained by the patient and accompanying visitor(s) for the recording of patient appointment to facilitate this note. I attest to having reviewed and edited the generated note for accuracy, though some syntax or spelling errors may persist. Please contact the author of this note for any clarification.

## 2025-04-24 NOTE — ASSESSMENT & PLAN NOTE
Flp above goal  Start RX atorvastatin 20 mg qhs  Repeat labs in 2 months   Follow a low cholesterol, low saturated fat diet with less than 200 mg of cholesterol a day.   Avoid fried foods and high saturated fats (nieto, sausage, cookies, cakes, chips, cheese, whole milk, butter, mayonnaise, creamy dressings, gravy, stew, gumbo, boudin, cracklins and cream sauces).  Add flax seed or fish oil supplements to diet.   Increase dietary fiber.   Regular exercise improves cholesterol levels.  Physical activity 5 times a week for 30 minutes per day (or 150 minutes per week).   Stressed importance of dietary modifications.    Lab Results   Component Value Date    CHOL 230 (H) 04/22/2025     Lab Results   Component Value Date    HDL 55 04/22/2025     Lab Results   Component Value Date    TRIG 222 (H) 04/22/2025     Lab Results   Component Value Date    VLDL 44 12/21/2023     Lab Results   Component Value Date    .00 12/21/2023

## 2025-04-24 NOTE — ASSESSMENT & PLAN NOTE
F/U [facility] to establish care as directed  Fibroscan ordered   US ABD limited on file   Repeat CMP in 2 months   ED Precautions  Tylenol and EtOH abstinences

## 2025-04-24 NOTE — ASSESSMENT & PLAN NOTE
A1C Above goal   Continue Metformin 500 mg BID  2 month f/u via virtual   Follow ADA diet.  Avoid soda, simple sweets, and limit rice/pasta/bread/starches and consume brown options when possible.   Maintain healthy weight with BMI goal <30.   Perform aerobic exercise for 150 minutes per week (or 5 days a week for 30 minutes each day).   Examine feet daily.     Lab Results   Component Value Date    HGBA1C 10.3 (H) 04/22/2025

## 2025-04-25 ENCOUNTER — PATIENT MESSAGE (OUTPATIENT)
Dept: INTERNAL MEDICINE | Facility: CLINIC | Age: 30
End: 2025-04-25
Payer: MEDICAID

## 2025-04-28 ENCOUNTER — TELEPHONE (OUTPATIENT)
Dept: INTERNAL MEDICINE | Facility: CLINIC | Age: 30
End: 2025-04-28
Payer: MEDICAID

## 2025-04-28 NOTE — TELEPHONE ENCOUNTER
----- Message from [employer] sent at 4/25/2025  1:18 PM CDT -----  Who Called: Cassandra Lewis is requesting assistance/information from provider's office.Symptoms (please be specific):  How long has patient had these symptoms:  List of preferred pharmacies on file (remove unneeded): If different, enter pharmacy into here including location and phone number: Preferred Method of Contact: Phone CallPatient's Preferred Phone Number on File: [phone] Best Call Back Number, if different:Additional Information: PA needed for blood-glucose sensor (FREESTYLE MAUREEN 3 SENSOR) Jenn, semaglutide (OZEMPIC) 0.25 mg or 0.5 mg (2 mg/3 mL) pen injector, atorvastatin (LIPITOR) 20 MG tablet.

## 2025-04-28 NOTE — TELEPHONE ENCOUNTER
I contacted patient PA was denied on all CGM , 'Atorvastain was dispensed , [facility]

## 2025-04-28 NOTE — TELEPHONE ENCOUNTER
----- Message from Abbey sent at 4/28/2025  3:51 PM CDT -----  Regarding: advice  Who Called: Cassandra Lewis is requesting assistance/information from provider's office.Symptoms (please be specific):  How long has patient had these symptoms:  List of preferred pharmacies on file (remove unneeded): [unfilled]If different, enter pharmacy into here including location and phone number: Preferred Method of Contact: Phone CallPatient's Preferred Phone Number on File: [phone] Best Call Back Number, if different:Additional Information: stated that she just spoke with the pharmacy, and they told her that Ozempic is still needing a PA. Please advise

## 2025-05-16 ENCOUNTER — TELEPHONE (OUTPATIENT)
Dept: INTERNAL MEDICINE | Facility: CLINIC | Age: 30
End: 2025-05-16
Payer: MEDICAID

## 2025-05-16 NOTE — TELEPHONE ENCOUNTER
Carlie called regarding PA , although we have confirmation PA paperwork was received she does not see paperwork , I resubmitted paperwork

## 2025-05-16 NOTE — TELEPHONE ENCOUNTER
Copied from CRM #[id number]. Topic: Medications - Medication Authorization  >> May 15, 2025  1:40 PM Evangelista wrote:  .Who Called: [facility]     Caller is requesting assistance/information from provider's office.    Symptoms (please be specific): n/a   How long has patient had these symptoms:  n/a  List of preferred pharmacies on file (remove unneeded): [unfilled]  If different, enter pharmacy into here including location and phone number: n/a      Preferred Method of Contact: Phone Call  Patient's Preferred Phone Number on File: [phone]   Best Call Back Number, if different: [phone]  Additional Information: calling in regards to PA issues for Ozempic

## 2025-06-02 ENCOUNTER — TELEPHONE (OUTPATIENT)
Dept: INTERNAL MEDICINE | Facility: CLINIC | Age: 30
End: 2025-06-02
Payer: MEDICAID

## 2025-06-02 DIAGNOSIS — E11.65 TYPE 2 DIABETES MELLITUS WITH HYPERGLYCEMIA, WITHOUT LONG-TERM CURRENT USE OF INSULIN: Primary | ICD-10-CM

## 2025-06-02 RX ORDER — SEMAGLUTIDE 0.68 MG/ML
0.5 INJECTION, SOLUTION SUBCUTANEOUS
Qty: 3 ML | Refills: 2 | Status: SHIPPED | OUTPATIENT
Start: 2025-06-02 | End: 2025-08-31

## 2025-06-06 ENCOUNTER — TELEPHONE (OUTPATIENT)
Dept: NEUROLOGY | Facility: CLINIC | Age: 30
End: 2025-06-06
Payer: MEDICAID

## 2025-06-10 ENCOUNTER — OFFICE VISIT (OUTPATIENT)
Dept: NEUROLOGY | Facility: CLINIC | Age: 30
End: 2025-06-10
Payer: MEDICAID

## 2025-06-10 ENCOUNTER — PATIENT MESSAGE (OUTPATIENT)
Dept: NEUROLOGY | Facility: CLINIC | Age: 30
End: 2025-06-10

## 2025-06-10 DIAGNOSIS — G43.001 MIGRAINE WITHOUT AURA AND WITH STATUS MIGRAINOSUS, NOT INTRACTABLE: Primary | Chronic | ICD-10-CM

## 2025-06-10 DIAGNOSIS — G47.00 INSOMNIA, UNSPECIFIED TYPE: Chronic | ICD-10-CM

## 2025-06-10 DIAGNOSIS — E66.01 CLASS 3 SEVERE OBESITY WITH SERIOUS COMORBIDITY AND BODY MASS INDEX (BMI) OF 40.0 TO 44.9 IN ADULT, UNSPECIFIED OBESITY TYPE: Chronic | ICD-10-CM

## 2025-06-10 DIAGNOSIS — E66.813 CLASS 3 SEVERE OBESITY WITH SERIOUS COMORBIDITY AND BODY MASS INDEX (BMI) OF 40.0 TO 44.9 IN ADULT, UNSPECIFIED OBESITY TYPE: Chronic | ICD-10-CM

## 2025-06-10 PROCEDURE — 3046F HEMOGLOBIN A1C LEVEL >9.0%: CPT | Mod: CPTII,95,, | Performed by: NURSE PRACTITIONER

## 2025-06-10 PROCEDURE — 1159F MED LIST DOCD IN RCRD: CPT | Mod: CPTII,95,, | Performed by: NURSE PRACTITIONER

## 2025-06-10 PROCEDURE — 3061F NEG MICROALBUMINURIA REV: CPT | Mod: CPTII,95,, | Performed by: NURSE PRACTITIONER

## 2025-06-10 PROCEDURE — 1160F RVW MEDS BY RX/DR IN RCRD: CPT | Mod: CPTII,95,, | Performed by: NURSE PRACTITIONER

## 2025-06-10 PROCEDURE — 3066F NEPHROPATHY DOC TX: CPT | Mod: CPTII,95,, | Performed by: NURSE PRACTITIONER

## 2025-06-10 PROCEDURE — 98005 SYNCH AUDIO-VIDEO EST LOW 20: CPT | Mod: 95,,, | Performed by: NURSE PRACTITIONER

## 2025-06-10 RX ORDER — RIZATRIPTAN BENZOATE 10 MG/1
10 TABLET ORAL 2 TIMES DAILY PRN
Qty: 9 TABLET | Refills: 6 | Status: SHIPPED | OUTPATIENT
Start: 2025-06-10 | End: 2025-07-10

## 2025-06-10 RX ORDER — ATOMOXETINE 25 MG/1
25 CAPSULE ORAL EVERY MORNING
COMMUNITY
Start: 2025-06-05

## 2025-06-10 NOTE — PROGRESS NOTES
[facility] Neurology Telemedicine Follow Up Visit Note    Initial Visit Date: 8/18/2023  Last Visit Date: 2/5/2025  Current Visit Date:  06/10/2025  This is a real-time audio/video visit that was performed with the originating site at patient's home and the distant site, [facility]. Verbal consent to participate in interactive audio & video visit was obtained.    I discussed with the patient regarding the nature of our telehealth visits, that:    - Our sessions are not being recorded and that personal health information is protected  - Provider would evaluate the patient and recommend diagnostics and treatments based on my assessment  - [facility] will provide follow up care in person if/when the patient needs it.     Chief Complaint:     Chief Complaint   Patient presents with    Migraine without aura and with status migrainosus, not intr     Last Migraine: 3 days ago  States that her migraines are okay and tolerable with her medications, but somedays are worse than others         History of Present Illness:      This is 30 y.o. female with history of anxiety, type 2 diabetes, allergic rhinitis who was referred for episodic migraine without aura. During last visit, propranolol 80 mg BID, TPM 25 mg daily, duloxetine 90 mg daily and Rizatriptan 10 mg BID were continued.    Today,  Pt states migraines have increased to 6-7 monthly starting last month. TPM 25 mg PRN (causes sedation), propranolol 80 mg BID and duloxetine 90 mg daily. Rizatriptan effective as abortive therapy, does not require second dose. Followed by mental health for depression and finds therapy helpful. States she is walking daily, weather permitting. Sleeping well.    Age of [place] : 9 years old      Headache Description:   right frontotemporal, pounding, severe, impeding day to day activity, lasting up to 7 days, with nausea and photophobia  Bifrontal, dull, moderate,  not impeding day to day activity, constant with nausea      Frequency: 3 migraine headache days per month; 1-2 monthly now    Provocation Factors: not on propranolol      Risk Factors  - Family history of headache disorder: Yes maternal grandmother and mother with headache.  - History of focal CNS lesions: No  - History of CNS infections: No  - History head trauma: No  - History of underlying mood disorder: Yes anxiety disorder.  Follows with Dr. Chaney.  - History of sleep disorder: Yes not well  - Recreational drug use: Yes marijuana  - Tobacco use: Yes occasional  - Alcohol use: Yes occasional  - Weight fluctuation: Yes dropped 10 lbs for the past 1 month.  - Isotretinoin or Tetracycline use:  No  - Family planning and contraceptive use: Yes not sexually active. However, does plan on becoming pregnant when she becomes sexually active in the future.      Medications:     Current Prophylactic  Duloxetine 90 mg daily (11/30/2022 to present)  Propanolol 80 mg twice a day (8/18/2023 to present): effective   Topiramate 25 mg daily (8/21/2023 to present): taking PRN Effective. Sedating;.     Current Abortive  Ibuprofen 800 mg TID PRN: ineffective   Rizatriptan 10 mg twice a day as needed (8/18/2023 to present): effective.     Prior Prophylactic  Topiramate 50 mg daily (8/18/2023 - 8/21/2023): sedation     Prior Abortive  Denied     Devices:     - VNS:  - TNS  - TMS:     Procedures:     - Botox:  - PSG block:   - Occipital nerve block:     Labs:     Results for orders placed or performed in visit on 04/22/25   Microalbumin/Creatinine Ratio, Urine    Collection Time: 04/22/25 10:13 AM   Result Value Ref Range    Urine Microalbumin 8.3 <=30.0 ug/mL    Urine Creatinine 114.1 (H) 45.0 - 106.0 mg/dL    Microalbumin Creatinine Ratio 7.3 0.0 - 30.0 mg/gm Cr   Hemoglobin A1C    Collection Time: 04/22/25 10:13 AM   Result Value Ref Range    Hemoglobin A1c 10.3 (H) 4.0 - 6.0 %    Estimated Average Glucose 248.9 (H) 70.0 - 115.0  mg/dL   Vitamin D    Collection Time: 04/22/25 10:13 AM   Result Value Ref Range    Vitamin D 44 30 - 80 ng/mL   Urinalysis, Reflex to Urine Culture    Collection Time: 04/22/25 10:13 AM    Specimen: Urine   Result Value Ref Range    Color, UA Yellow Yellow, Light-Yellow, Dark Yellow, Indira, Straw    Appearance, UA Clear Clear    Specific Gravity, UA 1.020 1.005 - 1.030    pH, UA 6.0 5.0 - 8.5    Protein, UA Negative Negative    Glucose, UA Negative Negative, Normal    Ketones, UA Negative Negative    Blood, UA Negative Negative    Bilirubin, UA Negative Negative    Urobilinogen, UA 0.2 0.2, 1.0, Normal    Nitrites, UA Negative Negative    Leukocyte Esterase, UA Negative Negative   T4, Free    Collection Time: 04/22/25 10:13 AM   Result Value Ref Range    Thyroxine Free 1.35 0.78 - 2.19 ng/dL   TSH    Collection Time: 04/22/25 10:13 AM   Result Value Ref Range    TSH 0.938 0.360 - 3.740 uIU/mL   Lipid Panel    Collection Time: 04/22/25 10:13 AM   Result Value Ref Range    Cholesterol Total 230 (H) 0 - 200 mg/dL    HDL Cholesterol 55 40 - 60 mg/dL    Triglyceride 222 (H) 30 - 200 mg/dL    LDL Cholesterol Direct 145.7 (H) 30.0 - 100.0 mg/dL   Comprehensive Metabolic Panel    Collection Time: 04/22/25 10:13 AM   Result Value Ref Range    Sodium 136 136 - 145 mmol/L    Potassium 4.3 3.5 - 5.1 mmol/L    Chloride 106 98 - 110 mmol/L    CO2 24 21 - 32 mmol/L    Glucose 191 (H) 70 - 115 mg/dL    Blood Urea Nitrogen 11 7.0 - 20.0 mg/dL    Creatinine 0.45 (L) 0.66 - 1.25 mg/dL    Calcium 9.6 8.4 - 10.2 mg/dL    Protein Total 7.8 6.3 - 8.2 gm/dL    Albumin 4.4 3.4 - 5.0 g/dL    Globulin 3.4 2.0 - 3.9 gm/dL    Albumin/Globulin Ratio 1.3 ratio    Bilirubin Total 0.5 0.0 - 1.0 mg/dL    ALP 87 50 - 144 unit/L     (H) 1 - 45 unit/L     (H) 14 - 36 unit/L    eGFR >90 mL/min/1.73/m2    Anion Gap 6.0 2.0 - 13.0 mEq/L    BUN/Creatinine Ratio 24 (H) 12 - 20   CBC with Differential    Collection Time: 04/22/25 10:13 AM    Result Value Ref Range    WBC 7.68 4.00 - 11.50 x10(3)/mcL    RBC 5.56 (H) 4.00 - 5.10 x10(6)/mcL    Hgb 14.2 11.8 - 16.0 g/dL    Hct 45.3 36.0 - 48.0 %    MCV 81.5 79.0 - 99.0 fL    MCH 25.5 (L) 27.0 - 34.0 pg    MCHC 31.3 31.0 - 37.0 g/dL    RDW 13.9 11.0 - 14.5 %    Platelet 247 140 - 371 x10(3)/mcL    MPV 11.2 9.4 - 12.4 fL    Neut % 55.1 37 - 73 %    Lymph % 36.7 20 - 55 %    Mono % 5.6 4.7 - 12.5 %    Eos % 2.0 0.7 - 7 %    Basophil % 0.5 0.1 - 1.2 %    Lymph # 2.82 1.16 - 3.74 x10(3)/mcL    Neut # 4.23 1.56 - 6.13 x10(3)/mcL    Mono # 0.43 (H) 0.24 - 0.36 x10(3)/mcL    Eos # 0.15 0.04 - 0.36 x10(3)/mcL    Baso # 0.04 0.01 - 0.08 x10(3)/mcL    Imm Gran # 0.01 0.00 - 0.03 x10(3)/mcL    Imm Grans % 0.1 0 - 0.5 %    NRBC% 0.0 <=1 %       Studies:     - MRI Brain w/out contrast 10/17/2022: I have reviewed the study independently and with the patient.  Unremarkable.   - MRA Head w/o Edd:   - MRV Head w/o Edd:   - NCHCT:  - Lumbar Puncture:    Review of Systems:     Review of Systems   All other systems reviewed and are negative.  As per HPI    Physical Exams:     There were no vitals filed for this visit.    Physical Exam  Vitals and nursing note reviewed.   Constitutional:       Appearance: Normal appearance. She is obese.   HENT:      Head: Normocephalic and atraumatic.      Right Ear: External ear normal.      Left Ear: External ear normal.      Nose: Nose normal.      Mouth/Throat:      Mouth: Mucous membranes are moist.      Pharynx: Oropharynx is clear.   Eyes:      Conjunctiva/sclera: Conjunctivae normal.   Pulmonary:      Effort: Pulmonary effort is normal.   Abdominal:      General: There is no distension.      Tenderness: There is no guarding.   Musculoskeletal:         General: Normal range of motion.      Cervical back: Normal range of motion.   Skin:     Coloration: Skin is not jaundiced.      Findings: No lesion or rash.   Neurological:      Mental Status: She is alert.     Comprehensive  Neurological Exam:  Mental Status: Alert Oriented to Self, Date, and Place. Comprehension wnl. No dysarthria.   CN II - XII: No ptosis OU, EOMI without nystagmus, Hearing grossly intact, Face Symmetric, Tongue and Uvula midline, Trapezius symmetric bilateral.   Motor: tone and bulk wnl throughout, no abnormal involuntary or voluntary movements, no satelliting, Fine finger movements wnl b/l, No pronator drift.   Sensory: JHONY due to nature of visit  Reflexes: JHONY due to nature of visit  Cerebellar: FNF wnl b/l  Romberg: Negative  Gait: normal, bilat arm swing intact    Assessment:     This is 30 y.o. female with history of anxiety, type 2 diabetes, allergic rhinitis who was referred for episodic migraine without aura. Migraines have increased to 6-7/month.  This is not a medication overuse HA. Taking TPM PRN instead of nightly. Rizatriptan effective as abortive therapy. Admits to snoring, but denies AM headache or dry mouth. Has never been tested for MARJAN, does not wish to proceed at this time. Walking daily, weather permitting.    Problem List Items Addressed This Visit          Neuro    Migraine without aura and with status migrainosus, not intractable - Primary (Chronic)    Relevant Medications    rizatriptan (MAXALT) 10 MG tablet       Endocrine    Class 3 severe obesity with serious comorbidity and body mass index (BMI) of 40.0 to 44.9 in adult (Chronic)       Other    Insomnia (Chronic)       Plan:     [] c/w Duloxetine 90 mg daily   [] c/w Propanolol 80 mg twice a day  [] take Topiramate 25 mg at bedtime consistently as preventative  [] c/w Rizatripan 10 mg twice a day as needed as abortive therapy  [] handout on hydration  [] drink 115 ounces of water daily for optimal hydration  [] call office for migraine > 24 hrs and failed abortive therapy; will call in HA cocktail  [] consider PSG in future for snoring      RTC 6 months -  okay    Headache education provided: good sleep hygiene and 7 hours of sleep per  night, stress management, medication overuse education provided. Using more 3 OTC per week may worsen headaches, high intensity interval training has shown to reduce headache frequency. Low carb, high protein has shown to reduce headache frequency. Patient is instructed in keep headache diary.     I have explained the treatment plan, diagnosis, and prognosis to patient. All questions are answered to the best of my knowledge.     YOLA Peterson  General Neurology  06/10/2025    The patient location is: [place]  The chief complaint leading to consultation is: migraine    Visit type: audiovisual    Face to Face time with patient: 20  30 minutes of total time spent on the encounter, which includes face to face time and non-face to face time preparing to see the patient (eg, review of tests), Obtaining and/or reviewing separately obtained history, Documenting clinical information in the electronic or other health record, Independently interpreting results (not separately reported) and communicating results to the patient/family/caregiver, or Care coordination (not separately reported).     Each patient to whom he or she provides medical services by telemedicine is:  (1) informed of the relationship between the physician and patient and the respective role of any other health care provider with respect to management of the patient; and (2) notified that he or she may decline to receive medical services by telemedicine and may withdraw from such care at any time.    Notes:

## 2025-06-27 ENCOUNTER — PATIENT MESSAGE (OUTPATIENT)
Dept: INTERNAL MEDICINE | Facility: CLINIC | Age: 30
End: 2025-06-27

## 2025-06-27 ENCOUNTER — LAB VISIT (OUTPATIENT)
Dept: LAB | Facility: HOSPITAL | Age: 30
End: 2025-06-27
Attending: NURSE PRACTITIONER
Payer: MEDICAID

## 2025-06-27 DIAGNOSIS — E11.65 TYPE 2 DIABETES MELLITUS WITH HYPERGLYCEMIA, WITHOUT LONG-TERM CURRENT USE OF INSULIN: ICD-10-CM

## 2025-06-27 DIAGNOSIS — R79.89 ELEVATED LFTS: ICD-10-CM

## 2025-06-27 DIAGNOSIS — E78.2 MIXED HYPERLIPIDEMIA: ICD-10-CM

## 2025-06-27 LAB
ALBUMIN SERPL-MCNC: 4.1 G/DL (ref 3.4–5)
ALBUMIN/GLOB SERPL: 1.2 RATIO
ALP SERPL-CCNC: 75 UNIT/L (ref 50–144)
ALT SERPL-CCNC: 92 UNIT/L (ref 1–45)
ANION GAP SERPL CALC-SCNC: 4 MEQ/L (ref 2–13)
AST SERPL-CCNC: 42 UNIT/L (ref 14–36)
BILIRUB SERPL-MCNC: 0.3 MG/DL (ref 0–1)
BUN SERPL-MCNC: 12 MG/DL (ref 7–20)
CALCIUM SERPL-MCNC: 9.4 MG/DL (ref 8.4–10.2)
CHLORIDE SERPL-SCNC: 107 MMOL/L (ref 98–110)
CHOLEST SERPL-MCNC: 188 MG/DL (ref 0–200)
CO2 SERPL-SCNC: 28 MMOL/L (ref 21–32)
CREAT SERPL-MCNC: 0.57 MG/DL (ref 0.66–1.25)
CREAT/UREA NIT SERPL: 21 (ref 12–20)
EST. AVERAGE GLUCOSE BLD GHB EST-MCNC: 188.6 MG/DL (ref 70–115)
GFR SERPLBLD CREATININE-BSD FMLA CKD-EPI: >90 ML/MIN/1.73/M2
GLOBULIN SER-MCNC: 3.3 GM/DL (ref 2–3.9)
GLUCOSE SERPL-MCNC: 139 MG/DL (ref 70–115)
HBA1C MFR BLD: 8.2 % (ref 4–6)
HDLC SERPL-MCNC: 47 MG/DL (ref 40–60)
LDLC SERPL DIRECT ASSAY-SCNC: 104.4 MG/DL (ref 30–100)
POTASSIUM SERPL-SCNC: 4.5 MMOL/L (ref 3.5–5.1)
PROT SERPL-MCNC: 7.4 GM/DL (ref 6.3–8.2)
SODIUM SERPL-SCNC: 139 MMOL/L (ref 136–145)
TRIGL SERPL-MCNC: 157 MG/DL (ref 30–200)

## 2025-06-27 PROCEDURE — 83036 HEMOGLOBIN GLYCOSYLATED A1C: CPT

## 2025-06-27 PROCEDURE — 36415 COLL VENOUS BLD VENIPUNCTURE: CPT

## 2025-06-27 PROCEDURE — 80061 LIPID PANEL: CPT

## 2025-06-27 PROCEDURE — 80053 COMPREHEN METABOLIC PANEL: CPT

## 2025-06-27 RX ORDER — SEMAGLUTIDE 0.68 MG/ML
0.25 INJECTION, SOLUTION SUBCUTANEOUS
Qty: 1.5 ML | Refills: 0 | Status: SHIPPED | OUTPATIENT
Start: 2025-06-27 | End: 2025-07-27

## 2025-06-30 ENCOUNTER — RESULTS FOLLOW-UP (OUTPATIENT)
Dept: INTERNAL MEDICINE | Facility: CLINIC | Age: 30
End: 2025-06-30

## 2025-07-18 ENCOUNTER — PATIENT MESSAGE (OUTPATIENT)
Dept: INTERNAL MEDICINE | Facility: CLINIC | Age: 30
End: 2025-07-18
Payer: MEDICAID

## 2025-07-25 ENCOUNTER — OFFICE VISIT (OUTPATIENT)
Dept: INTERNAL MEDICINE | Facility: CLINIC | Age: 30
End: 2025-07-25
Payer: MEDICAID

## 2025-07-25 DIAGNOSIS — R79.89 ELEVATED LFTS: ICD-10-CM

## 2025-07-25 DIAGNOSIS — E11.65 TYPE 2 DIABETES MELLITUS WITH HYPERGLYCEMIA, WITHOUT LONG-TERM CURRENT USE OF INSULIN: Primary | ICD-10-CM

## 2025-07-25 RX ORDER — BLOOD-GLUCOSE SENSOR
EACH MISCELLANEOUS
Qty: 3 EACH | Refills: 12 | Status: SHIPPED | OUTPATIENT
Start: 2025-07-25

## 2025-07-25 RX ORDER — SEMAGLUTIDE 1.34 MG/ML
1 INJECTION, SOLUTION SUBCUTANEOUS
Qty: 3 ML | Refills: 2 | Status: SHIPPED | OUTPATIENT
Start: 2025-07-25 | End: 2025-10-23

## 2025-07-25 NOTE — ASSESSMENT & PLAN NOTE
A1C Above goal   DC Metformin due to GI SE  Increase RX ozempic 1 mg weekly  Freestyle Selvin 3 sent to pharmacy   Start glucose monitoring am fasting  2 month f/u via virtual   Follow ADA diet.  Avoid soda, simple sweets, and limit rice/pasta/bread/starches and consume brown options when possible.   Maintain healthy weight with BMI goal <30.   Perform aerobic exercise for 150 minutes per week (or 5 days a week for 30 minutes each day).   Examine feet daily.     Lab Results   Component Value Date    HGBA1C 10.3 (H) 04/22/2025

## 2025-07-25 NOTE — PROGRESS NOTES
Leyda Sanders, GRACY   [facility]  [address] [address]      PATIENT NAME: Cassandra Escobedo  : 1995  DATE: 25  MRN: [id number]      Reason for Visit / Chief Complaint: Diabetes       History of Present Illness / Problem Focused Workflow     Cassandra Escobedo presents to the clinic with Diabetes     31 yo WF here today for f/u. Medical problems include T2DM, Migraines & anxiety. Followed by [facility] and Dr. Chaney.     Cervical Cancer Screening: [place] - F/U   Osteoporosis Screenin23 Vitamin D Level   Diabetic Screening: [place] [facility]   HCV Screening: 10/03/22  Wellness: 2025  History of Present Illness  Patient presents today for follow up She reports experiencing nausea with metformin, describing feeling like she will vomit after taking it in recent weeks, will DC today. Despite having multiple glucose monitoring devices at home, she admits to not checking her blood sugar. A1c has increased from 7.1 in August to 10.3 currently. Med changes discussed. She receives depression medication from her psychologist. She expresses interest in discussing ADHD medication with a specialist. Will send referral to new provider to establish care. Liver function tests remain elevated since November. Agreeable to fibroscan, aware of appt with GI in September. Thyroid levels and urinalysis are normal. Cholesterol levels are elevated. Agreeable to starting statin. Will f/u in 2 months via virtual with labs and prn.     2025  History of Present Illness  Patient presents today for diabetes follow up. She reports improvement in diabetes management with A1C decreasing from 10.3 in April to 8.2. She currently takes Ozempic 0.5 mg with plan to increase to 1 mg. Her current morning blood sugar is 172. She experiences low blood sugar on Ozempic injection days and eats extra food to counteract this  effect. Her ADHD medication impacts her daily eating habits by decreasing appetite and causing inconsistent meal intake throughout the day, further complicating blood sugar management. She has encountered significant challenges with insurance authorization, experiencing a 27-day delay in obtaining medication and requiring multiple follow-up calls. She is taking Atorvastatin 20 mg for heart health management in the context of diabetes, which is being tolerated well. Will f/u/ with repeat labs again in 2 months.                              Review of Systems     Review of Systems      Medications and Allergies     Medications  Medication List with Changes/Refills   New Medications    BLOOD-GLUCOSE SENSOR (DEXCOM G7 SENSOR) COSME    Apply 1 sensor every 10 days.    BLOOD-GLUCOSE,,CONT (DEXCOM ) MISC    As Directed    SEMAGLUTIDE (OZEMPIC) 1 MG/DOSE (4 MG/3 ML)    Inject 1 mg into the skin every 7 days. For Diabetes   Current Medications    ATOMOXETINE (STRATTERA) 25 MG CAPSULE    Take 25 mg by mouth every morning.    ATORVASTATIN (LIPITOR) 20 MG TABLET    Take 1 tablet (20 mg total) by mouth every evening. For High Cholesterol & Diabetes    CETIRIZINE (ZYRTEC) 10 MG TABLET    Take 1 tablet (10 mg total) by mouth once daily.    FLUTICASONE PROPIONATE (FLONASE) 50 MCG/ACTUATION NASAL SPRAY    2 sprays (100 mcg total) by Each Nostril route once daily.    PROPRANOLOL (INDERAL) 80 MG TABLET    Take 1 tablet (80 mg total) by mouth 2 (two) times daily.    RIZATRIPTAN (MAXALT) 10 MG TABLET    Take 1 tablet (10 mg total) by mouth 2 (two) times daily as needed for Migraine.    TOPIRAMATE (TOPAMAX) 25 MG TABLET    Take 1 tablet (25 mg total) by mouth every evening.    TRAZODONE (DESYREL) 50 MG TABLET    Take 50-100mg (1-2 tablets) by mouth nightly as needed for insomnia.    VENLAFAXINE (EFFEXOR-XR) 75 MG 24 HR CAPSULE    Take 1 capsule (75 mg total) by mouth once daily.   Discontinued Medications    BLOOD-GLUCOSE  SENSOR (FREESTYLE MAUREEN 3 SENSOR) COSME    For Continuous Glucose Monitoring.    FLASH GLUCOSE SCANNING READER (FREESTYLE MAUREEN 2 READER) MISC    FREESTYLE MAUREEN 3 READER    SEMAGLUTIDE (OZEMPIC) 0.25 MG OR 0.5 MG (2 MG/3 ML) PEN INJECTOR    Inject 0.25 mg into the skin every 7 days. For diabetes         Allergies  Review of patient's allergies indicates:   Allergen Reactions    Phenergan plain     Promethazine     Sulfa (sulfonamide antibiotics)        Physical Examination   There were no vitals filed for this visit.  Physical Exam      Results     Lab Results   Component Value Date    WBC 7.68 04/22/2025    RBC 5.56 (H) 04/22/2025    HGB 14.2 04/22/2025    HCT 45.3 04/22/2025    MCV 81.5 04/22/2025    MCH 25.5 (L) 04/22/2025    MCHC 31.3 04/22/2025    RDW 13.9 04/22/2025     04/22/2025    MPV 11.2 04/22/2025     Sodium   Date Value Ref Range Status   06/27/2025 139 136 - 145 mmol/L Final     Potassium   Date Value Ref Range Status   06/27/2025 4.5 3.5 - 5.1 mmol/L Final     Chloride   Date Value Ref Range Status   06/27/2025 107 98 - 110 mmol/L Final     CO2   Date Value Ref Range Status   06/27/2025 28 21 - 32 mmol/L Final     Glucose   Date Value Ref Range Status   06/27/2025 139 (H) 70 - 115 mg/dL Final     Blood Urea Nitrogen   Date Value Ref Range Status   06/27/2025 12 7.0 - 20.0 mg/dL Final     Creatinine   Date Value Ref Range Status   06/27/2025 0.57 (L) 0.66 - 1.25 mg/dL Final     Calcium   Date Value Ref Range Status   06/27/2025 9.4 8.4 - 10.2 mg/dL Final     Protein Total   Date Value Ref Range Status   06/27/2025 7.4 6.3 - 8.2 gm/dL Final     Albumin   Date Value Ref Range Status   06/27/2025 4.1 3.4 - 5.0 g/dL Final     Bilirubin Total   Date Value Ref Range Status   06/27/2025 0.3 0.0 - 1.0 mg/dL Final     ALP   Date Value Ref Range Status   06/27/2025 75 50 - 144 unit/L Final     AST   Date Value Ref Range Status   06/27/2025 42 (H) 14 - 36 unit/L Final     ALT   Date Value Ref Range Status    06/27/2025 92 (H) 1 - 45 unit/L Final     eGFR   Date Value Ref Range Status   09/15/2021 159 mL/min/1.73 m2 Final     Lab Results   Component Value Date    CHOL 188 06/27/2025     Lab Results   Component Value Date    HDL 47 06/27/2025     Lab Results   Component Value Date    TRIG 157 06/27/2025     Lab Results   Component Value Date    VLDL 44 12/21/2023     Lab Results   Component Value Date    .00 12/21/2023     Lab Results   Component Value Date    TSH 0.938 04/22/2025     Lab Results   Component Value Date    PHUR 6.0 04/22/2025    PROTEINUA Negative 04/22/2025    GLUCUA Negative 04/22/2025    OCCULTUA Negative 04/22/2025    NITRITE Negative 04/22/2025    LEUKOCYTESUR Negative 04/22/2025     Lab Results   Component Value Date    HGBA1C 8.2 (H) 06/27/2025    HGBA1C 10.3 (H) 04/22/2025    HGBA1C 7.1 (H) 08/14/2024           Assessment         ICD-10-CM ICD-9-CM   1. Type 2 diabetes mellitus with hyperglycemia, without long-term current use of insulin  E11.65 250.00     790.29       Plan      1. Type 2 diabetes mellitus with hyperglycemia, without long-term current use of insulin  Assessment & Plan:  A1C Above goal   DC Metformin due to GI SE  Increase RX ozempic 1 mg weekly  Freestyle Selvin 3 sent to pharmacy   Start glucose monitoring am fasting  2 month f/u via virtual   Follow ADA diet.  Avoid soda, simple sweets, and limit rice/pasta/bread/starches and consume brown options when possible.   Maintain healthy weight with BMI goal <30.   Perform aerobic exercise for 150 minutes per week (or 5 days a week for 30 minutes each day).   Examine feet daily.     Lab Results   Component Value Date    HGBA1C 10.3 (H) 04/22/2025         Orders:  -     semaglutide (OZEMPIC) 1 mg/dose (4 mg/3 mL); Inject 1 mg into the skin every 7 days. For Diabetes  Dispense: 3 mL; Refill: 2  -     blood-glucose,,cont (DEXCOM ) Misc; As Directed  Dispense: 1 each; Refill: 0  -     blood-glucose sensor (DEXCOM G7  SENSOR) Jenn; Apply 1 sensor every 10 days.  Dispense: 3 each; Refill: 12         Future Appointments   Date Time Provider Department Center   9/29/2025  1:00 PM Cheryl Dominguez FNP [facility] GASTRO [place]    10/8/2025  1:30 PM Amie Bethea FNP [facility] GYN [place]    12/9/2025 10:00 AM Maricel Weems ANP [facility] NEURO [place]    1/6/2026  9:00 AM Poncho Flores MD [facility] ENT [place]            Signature:     [facility]  [address] [address]    Date of encounter: 7/25/25    This note was generated with the assistance of ambient listening technology. Verbal consent was obtained by the patient and accompanying visitor(s) for the recording of patient appointment to facilitate this note. I attest to having reviewed and edited the generated note for accuracy, though some syntax or spelling errors may persist. Please contact the author of this note for any clarification.    The patient location is: home  The chief complaint leading to consultation is: T2DM    Visit type: audiovisual    Face to Face time with patient: 15  20 minutes of total time spent on the encounter, which includes face to face time and non-face to face time preparing to see the patient (eg, review of tests), Obtaining and/or reviewing separately obtained history, Documenting clinical information in the electronic or other health record, Independently interpreting results (not separately reported) and communicating results to the patient/family/caregiver, or Care coordination (not separately reported).         Each patient to whom he or she provides medical services by telemedicine is:  (1) informed of the relationship between the physician and patient and the respective role of any other health care provider with respect to management of the patient; and (2) notified that he or she may decline to receive medical services by telemedicine and may withdraw from  such care at any time.    Notes:

## 2025-08-20 ENCOUNTER — PATIENT MESSAGE (OUTPATIENT)
Dept: OTOLARYNGOLOGY | Facility: CLINIC | Age: 30
End: 2025-08-20
Payer: MEDICAID

## 2025-08-20 ENCOUNTER — PATIENT MESSAGE (OUTPATIENT)
Dept: INTERNAL MEDICINE | Facility: CLINIC | Age: 30
End: 2025-08-20
Payer: MEDICAID

## 2025-08-20 DIAGNOSIS — E11.65 TYPE 2 DIABETES MELLITUS WITH HYPERGLYCEMIA, WITHOUT LONG-TERM CURRENT USE OF INSULIN: ICD-10-CM

## 2025-08-25 RX ORDER — BLOOD-GLUCOSE SENSOR
EACH MISCELLANEOUS
Qty: 3 EACH | Refills: 12 | Status: SHIPPED | OUTPATIENT
Start: 2025-08-25

## 2025-08-27 ENCOUNTER — OFFICE VISIT (OUTPATIENT)
Dept: OTOLARYNGOLOGY | Facility: CLINIC | Age: 30
End: 2025-08-27
Payer: MEDICAID

## 2025-08-27 VITALS
SYSTOLIC BLOOD PRESSURE: 117 MMHG | BODY MASS INDEX: 38.82 KG/M2 | HEART RATE: 89 BPM | RESPIRATION RATE: 18 BRPM | HEIGHT: 65 IN | WEIGHT: 233 LBS | OXYGEN SATURATION: 99 % | TEMPERATURE: 99 F | DIASTOLIC BLOOD PRESSURE: 77 MMHG

## 2025-08-27 DIAGNOSIS — R11.2 NAUSEA AND VOMITING IN ADULT: ICD-10-CM

## 2025-08-27 DIAGNOSIS — J32.9 CHRONIC RHINOSINUSITIS: Primary | ICD-10-CM

## 2025-08-27 DIAGNOSIS — H91.90 SUBJECTIVE HEARING LOSS: ICD-10-CM

## 2025-08-27 PROCEDURE — 99214 OFFICE O/P EST MOD 30 MIN: CPT | Mod: PBBFAC | Performed by: OTOLARYNGOLOGY

## 2025-08-27 RX ORDER — CIPROFLOXACIN AND DEXAMETHASONE 3; 1 MG/ML; MG/ML
4 SUSPENSION/ DROPS AURICULAR (OTIC) 2 TIMES DAILY
Qty: 7.5 ML | Refills: 0 | Status: SHIPPED | OUTPATIENT
Start: 2025-08-27 | End: 2025-09-03